# Patient Record
Sex: FEMALE | Race: WHITE | NOT HISPANIC OR LATINO | Employment: FULL TIME | URBAN - METROPOLITAN AREA
[De-identification: names, ages, dates, MRNs, and addresses within clinical notes are randomized per-mention and may not be internally consistent; named-entity substitution may affect disease eponyms.]

---

## 2017-04-11 ENCOUNTER — MYC MEDICAL ADVICE (OUTPATIENT)
Dept: ENDOCRINOLOGY | Facility: CLINIC | Age: 32
End: 2017-04-11

## 2017-04-12 ENCOUNTER — TELEPHONE (OUTPATIENT)
Dept: ENDOCRINOLOGY | Facility: CLINIC | Age: 32
End: 2017-04-12

## 2017-04-12 DIAGNOSIS — E03.8 OTHER SPECIFIED HYPOTHYROIDISM: ICD-10-CM

## 2017-04-12 DIAGNOSIS — E03.8 OTHER SPECIFIED HYPOTHYROIDISM: Primary | ICD-10-CM

## 2017-04-12 PROCEDURE — 84439 ASSAY OF FREE THYROXINE: CPT | Performed by: FAMILY MEDICINE

## 2017-04-12 PROCEDURE — 36415 COLL VENOUS BLD VENIPUNCTURE: CPT | Performed by: FAMILY MEDICINE

## 2017-04-12 PROCEDURE — 84443 ASSAY THYROID STIM HORMONE: CPT | Performed by: FAMILY MEDICINE

## 2017-04-13 ENCOUNTER — TELEPHONE (OUTPATIENT)
Dept: ENDOCRINOLOGY | Facility: CLINIC | Age: 32
End: 2017-04-13

## 2017-04-13 DIAGNOSIS — Z86.39 HISTORY OF CUSHING DISEASE: Primary | ICD-10-CM

## 2017-04-13 DIAGNOSIS — Z86.39 HISTORY OF CUSHING DISEASE: ICD-10-CM

## 2017-04-13 LAB
T4 FREE SERPL-MCNC: 1.46 NG/DL (ref 0.76–1.46)
TSH SERPL DL<=0.05 MIU/L-ACNC: <0.01 MU/L (ref 0.4–4)

## 2017-04-13 PROCEDURE — 82533 TOTAL CORTISOL: CPT | Performed by: FAMILY MEDICINE

## 2017-04-13 PROCEDURE — 36415 COLL VENOUS BLD VENIPUNCTURE: CPT | Performed by: FAMILY MEDICINE

## 2017-04-14 LAB — CORTIS SERPL-MCNC: 10.2 UG/DL (ref 4–22)

## 2017-05-02 DIAGNOSIS — E03.8 OTHER SPECIFIED HYPOTHYROIDISM: ICD-10-CM

## 2017-05-02 NOTE — TELEPHONE ENCOUNTER
levothyroxine  Last Written Prescription Date:  6/7/16  Last Fill Quantity: 90,   # refills: 3  Last Office Visit : 7/20/16  Future Office visit:  No future appt    Routing refill request to provider for review/approval because:  Drug not on the [facility], [facility] or [facility] refill protocol

## 2017-05-03 RX ORDER — LEVOTHYROXINE SODIUM 175 UG/1
175 TABLET ORAL DAILY
Qty: 90 TABLET | Refills: 3 | Status: SHIPPED | OUTPATIENT
Start: 2017-05-03 | End: 2018-02-12

## 2017-06-19 DIAGNOSIS — Z86.39 HISTORY OF CUSHING DISEASE: Primary | ICD-10-CM

## 2017-06-24 ENCOUNTER — HEALTH MAINTENANCE LETTER (OUTPATIENT)
Age: 32
End: 2017-06-24

## 2017-11-03 DIAGNOSIS — Z86.39 HISTORY OF CUSHING DISEASE: ICD-10-CM

## 2017-11-03 DIAGNOSIS — E03.8 OTHER SPECIFIED HYPOTHYROIDISM: ICD-10-CM

## 2017-11-03 DIAGNOSIS — E03.8 OTHER SPECIFIED HYPOTHYROIDISM: Primary | ICD-10-CM

## 2017-11-03 PROCEDURE — 82533 TOTAL CORTISOL: CPT | Performed by: FAMILY MEDICINE

## 2017-11-03 PROCEDURE — 84443 ASSAY THYROID STIM HORMONE: CPT | Performed by: FAMILY MEDICINE

## 2017-11-03 PROCEDURE — 36415 COLL VENOUS BLD VENIPUNCTURE: CPT | Performed by: FAMILY MEDICINE

## 2017-11-03 PROCEDURE — 84436 ASSAY OF TOTAL THYROXINE: CPT | Performed by: FAMILY MEDICINE

## 2017-11-04 LAB
CORTIS SERPL-MCNC: 10.1 UG/DL (ref 4–22)
T4 SERPL-MCNC: 16.2 UG/DL (ref 4.5–13.9)
TSH SERPL DL<=0.005 MIU/L-ACNC: <0.01 MU/L (ref 0.4–4)

## 2017-11-29 ENCOUNTER — TRANSFERRED RECORDS (OUTPATIENT)
Dept: HEALTH INFORMATION MANAGEMENT | Facility: CLINIC | Age: 32
End: 2017-11-29

## 2017-11-29 LAB
ABO + RH BLD: NORMAL
ABO + RH BLD: NORMAL
BLD GP AB SCN SERPL QL: NEGATIVE
C TRACH DNA SPEC QL PROBE+SIG AMP: NEGATIVE
HBV SURFACE AG SERPL QL IA: NEGATIVE
HIV 1+2 AB+HIV1 P24 AG SERPL QL IA: NEGATIVE
N GONORRHOEA DNA SPEC QL PROBE+SIG AMP: NEGATIVE
RUBELLA ANTIBODY IGG QUANTITATIVE: NORMAL IU/ML
SPECIMEN DESCRIP: NORMAL
SPECIMEN DESCRIPTION: NORMAL
T PALLIDUM IGG SER QL: NON REACTIVE

## 2017-12-04 DIAGNOSIS — Z86.39 HISTORY OF CUSHING DISEASE: ICD-10-CM

## 2017-12-04 DIAGNOSIS — E03.8 OTHER SPECIFIED HYPOTHYROIDISM: ICD-10-CM

## 2017-12-04 LAB
CORTIS SERPL-MCNC: 7 UG/DL (ref 4–22)
T4 SERPL-MCNC: 14.2 UG/DL (ref 4.5–13.9)

## 2017-12-04 PROCEDURE — 82533 TOTAL CORTISOL: CPT | Performed by: FAMILY MEDICINE

## 2017-12-04 PROCEDURE — 36415 COLL VENOUS BLD VENIPUNCTURE: CPT | Performed by: FAMILY MEDICINE

## 2017-12-04 PROCEDURE — 84436 ASSAY OF TOTAL THYROXINE: CPT | Performed by: FAMILY MEDICINE

## 2017-12-04 PROCEDURE — 84443 ASSAY THYROID STIM HORMONE: CPT | Performed by: FAMILY MEDICINE

## 2017-12-05 LAB — TSH SERPL DL<=0.005 MIU/L-ACNC: <0.01 MU/L (ref 0.4–4)

## 2018-01-23 DIAGNOSIS — E03.8 OTHER SPECIFIED HYPOTHYROIDISM: ICD-10-CM

## 2018-01-23 DIAGNOSIS — Z86.39 HISTORY OF CUSHING DISEASE: ICD-10-CM

## 2018-01-23 LAB
CORTIS SERPL-MCNC: 13.2 UG/DL (ref 4–22)
T4 SERPL-MCNC: 14.8 UG/DL (ref 4.5–13.9)

## 2018-01-23 PROCEDURE — 84436 ASSAY OF TOTAL THYROXINE: CPT | Performed by: FAMILY MEDICINE

## 2018-01-23 PROCEDURE — 36415 COLL VENOUS BLD VENIPUNCTURE: CPT | Performed by: FAMILY MEDICINE

## 2018-01-23 PROCEDURE — 82533 TOTAL CORTISOL: CPT | Performed by: FAMILY MEDICINE

## 2018-01-23 PROCEDURE — 84443 ASSAY THYROID STIM HORMONE: CPT | Performed by: FAMILY MEDICINE

## 2018-01-24 LAB — TSH SERPL DL<=0.005 MIU/L-ACNC: <0.01 MU/L (ref 0.4–4)

## 2018-01-31 ENCOUNTER — MYC MEDICAL ADVICE (OUTPATIENT)
Dept: ENDOCRINOLOGY | Facility: CLINIC | Age: 33
End: 2018-01-31

## 2018-02-12 DIAGNOSIS — E03.8 OTHER SPECIFIED HYPOTHYROIDISM: ICD-10-CM

## 2018-02-12 RX ORDER — LEVOTHYROXINE SODIUM 175 UG/1
TABLET ORAL
Qty: 96 TABLET | Refills: 3 | Status: ON HOLD | OUTPATIENT
Start: 2018-02-12 | End: 2018-05-25

## 2018-05-08 DIAGNOSIS — Z86.39 HISTORY OF CUSHING DISEASE: ICD-10-CM

## 2018-05-08 DIAGNOSIS — E03.8 OTHER SPECIFIED HYPOTHYROIDISM: ICD-10-CM

## 2018-05-08 LAB
CORTIS SERPL-MCNC: 19 UG/DL (ref 4–22)
T4 SERPL-MCNC: 15.7 UG/DL (ref 4.5–13.9)

## 2018-05-08 PROCEDURE — 84436 ASSAY OF TOTAL THYROXINE: CPT | Performed by: FAMILY MEDICINE

## 2018-05-08 PROCEDURE — 82533 TOTAL CORTISOL: CPT | Performed by: FAMILY MEDICINE

## 2018-05-08 PROCEDURE — 84443 ASSAY THYROID STIM HORMONE: CPT | Performed by: FAMILY MEDICINE

## 2018-05-08 PROCEDURE — 36415 COLL VENOUS BLD VENIPUNCTURE: CPT | Performed by: FAMILY MEDICINE

## 2018-05-09 LAB — TSH SERPL DL<=0.005 MIU/L-ACNC: <0.01 MU/L (ref 0.4–4)

## 2018-05-25 ENCOUNTER — HOSPITAL ENCOUNTER (INPATIENT)
Facility: CLINIC | Age: 33
LOS: 2 days | Discharge: HOME HEALTH | End: 2018-05-27
Attending: OBSTETRICS & GYNECOLOGY | Admitting: OBSTETRICS & GYNECOLOGY
Payer: COMMERCIAL

## 2018-05-25 ENCOUNTER — ANESTHESIA (OUTPATIENT)
Dept: OBGYN | Facility: CLINIC | Age: 33
End: 2018-05-25
Payer: COMMERCIAL

## 2018-05-25 ENCOUNTER — HOSPITAL ENCOUNTER (OUTPATIENT)
Dept: ULTRASOUND IMAGING | Facility: CLINIC | Age: 33
Discharge: HOME | End: 2018-05-25
Attending: OBSTETRICS & GYNECOLOGY | Admitting: OBSTETRICS & GYNECOLOGY
Payer: COMMERCIAL

## 2018-05-25 ENCOUNTER — ANESTHESIA EVENT (OUTPATIENT)
Dept: OBGYN | Facility: CLINIC | Age: 33
End: 2018-05-25
Payer: COMMERCIAL

## 2018-05-25 DIAGNOSIS — R52 PAIN: ICD-10-CM

## 2018-05-25 LAB
ABO + RH BLD: NORMAL
ABO + RH BLD: NORMAL
SPECIMEN EXP DATE BLD: NORMAL

## 2018-05-25 PROCEDURE — G0463 HOSPITAL OUTPT CLINIC VISIT: HCPCS | Mod: 25

## 2018-05-25 PROCEDURE — 12000037 ZZH R&B POSTPARTUM INTERMEDIATE

## 2018-05-25 PROCEDURE — 40000809 ZZH STATISTIC NO DOCUMENTATION TO SUPPORT CHARGE

## 2018-05-25 PROCEDURE — 25000128 H RX IP 250 OP 636: Performed by: OBSTETRICS & GYNECOLOGY

## 2018-05-25 PROCEDURE — 59025 FETAL NON-STRESS TEST: CPT

## 2018-05-25 PROCEDURE — 3E0R3BZ INTRODUCTION OF ANESTHETIC AGENT INTO SPINAL CANAL, PERCUTANEOUS APPROACH: ICD-10-PCS | Performed by: ANESTHESIOLOGY

## 2018-05-25 PROCEDURE — 25000128 H RX IP 250 OP 636: Performed by: ANESTHESIOLOGY

## 2018-05-25 PROCEDURE — 86900 BLOOD TYPING SEROLOGIC ABO: CPT | Performed by: OBSTETRICS & GYNECOLOGY

## 2018-05-25 PROCEDURE — 27110038 ZZH RX 271: Performed by: ANESTHESIOLOGY

## 2018-05-25 PROCEDURE — 25000125 ZZHC RX 250: Performed by: ANESTHESIOLOGY

## 2018-05-25 PROCEDURE — 37000011 ZZH ANESTHESIA WARD SERVICE

## 2018-05-25 PROCEDURE — 88307 TISSUE EXAM BY PATHOLOGIST: CPT | Mod: 26 | Performed by: OBSTETRICS & GYNECOLOGY

## 2018-05-25 PROCEDURE — 93971 EXTREMITY STUDY: CPT | Mod: LT

## 2018-05-25 PROCEDURE — 72200001 ZZH LABOR CARE VAGINAL DELIVERY SINGLE

## 2018-05-25 PROCEDURE — 25000132 ZZH RX MED GY IP 250 OP 250 PS 637: Performed by: OBSTETRICS & GYNECOLOGY

## 2018-05-25 PROCEDURE — 86780 TREPONEMA PALLIDUM: CPT | Performed by: OBSTETRICS & GYNECOLOGY

## 2018-05-25 PROCEDURE — 10907ZC DRAINAGE OF AMNIOTIC FLUID, THERAPEUTIC FROM PRODUCTS OF CONCEPTION, VIA NATURAL OR ARTIFICIAL OPENING: ICD-10-PCS | Performed by: OBSTETRICS & GYNECOLOGY

## 2018-05-25 PROCEDURE — 86901 BLOOD TYPING SEROLOGIC RH(D): CPT | Performed by: OBSTETRICS & GYNECOLOGY

## 2018-05-25 PROCEDURE — 36415 COLL VENOUS BLD VENIPUNCTURE: CPT | Performed by: OBSTETRICS & GYNECOLOGY

## 2018-05-25 PROCEDURE — 0HQ9XZZ REPAIR PERINEUM SKIN, EXTERNAL APPROACH: ICD-10-PCS | Performed by: OBSTETRICS & GYNECOLOGY

## 2018-05-25 PROCEDURE — 88307 TISSUE EXAM BY PATHOLOGIST: CPT | Performed by: OBSTETRICS & GYNECOLOGY

## 2018-05-25 PROCEDURE — 00HU33Z INSERTION OF INFUSION DEVICE INTO SPINAL CANAL, PERCUTANEOUS APPROACH: ICD-10-PCS | Performed by: ANESTHESIOLOGY

## 2018-05-25 RX ORDER — ROPIVACAINE HYDROCHLORIDE 2 MG/ML
10 INJECTION, SOLUTION EPIDURAL; INFILTRATION; PERINEURAL ONCE
Status: COMPLETED | OUTPATIENT
Start: 2018-05-25 | End: 2018-05-25

## 2018-05-25 RX ORDER — NALOXONE HYDROCHLORIDE 0.4 MG/ML
.1-.4 INJECTION, SOLUTION INTRAMUSCULAR; INTRAVENOUS; SUBCUTANEOUS
Status: DISCONTINUED | OUTPATIENT
Start: 2018-05-25 | End: 2018-05-25

## 2018-05-25 RX ORDER — EPHEDRINE SULFATE 50 MG/ML
5 INJECTION, SOLUTION INTRAMUSCULAR; INTRAVENOUS; SUBCUTANEOUS
Status: DISCONTINUED | OUTPATIENT
Start: 2018-05-25 | End: 2018-05-25

## 2018-05-25 RX ORDER — OXYCODONE AND ACETAMINOPHEN 5; 325 MG/1; MG/1
1 TABLET ORAL
Status: DISCONTINUED | OUTPATIENT
Start: 2018-05-25 | End: 2018-05-25

## 2018-05-25 RX ORDER — FENTANYL CITRATE 50 UG/ML
100 INJECTION, SOLUTION INTRAMUSCULAR; INTRAVENOUS ONCE
Status: COMPLETED | OUTPATIENT
Start: 2018-05-25 | End: 2018-05-25

## 2018-05-25 RX ORDER — OXYTOCIN/0.9 % SODIUM CHLORIDE 30/500 ML
100 PLASTIC BAG, INJECTION (ML) INTRAVENOUS CONTINUOUS
Status: DISCONTINUED | OUTPATIENT
Start: 2018-05-25 | End: 2018-05-27 | Stop reason: HOSPADM

## 2018-05-25 RX ORDER — OXYTOCIN/0.9 % SODIUM CHLORIDE 30/500 ML
340 PLASTIC BAG, INJECTION (ML) INTRAVENOUS CONTINUOUS PRN
Status: DISCONTINUED | OUTPATIENT
Start: 2018-05-25 | End: 2018-05-27 | Stop reason: HOSPADM

## 2018-05-25 RX ORDER — HYDROCORTISONE 2.5 %
CREAM (GRAM) TOPICAL 3 TIMES DAILY PRN
Status: DISCONTINUED | OUTPATIENT
Start: 2018-05-25 | End: 2018-05-27 | Stop reason: HOSPADM

## 2018-05-25 RX ORDER — METHYLERGONOVINE MALEATE 0.2 MG/ML
200 INJECTION INTRAVENOUS
Status: DISCONTINUED | OUTPATIENT
Start: 2018-05-25 | End: 2018-05-25

## 2018-05-25 RX ORDER — LIDOCAINE HYDROCHLORIDE AND EPINEPHRINE 15; 5 MG/ML; UG/ML
3 INJECTION, SOLUTION EPIDURAL
Status: DISCONTINUED | OUTPATIENT
Start: 2018-05-25 | End: 2018-05-25

## 2018-05-25 RX ORDER — NALOXONE HYDROCHLORIDE 0.4 MG/ML
.1-.4 INJECTION, SOLUTION INTRAMUSCULAR; INTRAVENOUS; SUBCUTANEOUS
Status: DISCONTINUED | OUTPATIENT
Start: 2018-05-25 | End: 2018-05-27 | Stop reason: HOSPADM

## 2018-05-25 RX ORDER — FENTANYL CITRATE 50 UG/ML
50-100 INJECTION, SOLUTION INTRAMUSCULAR; INTRAVENOUS
Status: DISCONTINUED | OUTPATIENT
Start: 2018-05-25 | End: 2018-05-25

## 2018-05-25 RX ORDER — PRENATAL VIT/IRON FUM/FOLIC AC 27MG-0.8MG
1 TABLET ORAL DAILY
COMMUNITY
End: 2018-09-21

## 2018-05-25 RX ORDER — ACETAMINOPHEN 325 MG/1
650 TABLET ORAL EVERY 4 HOURS PRN
Status: DISCONTINUED | OUTPATIENT
Start: 2018-05-25 | End: 2018-05-27 | Stop reason: HOSPADM

## 2018-05-25 RX ORDER — AMOXICILLIN 250 MG
2 CAPSULE ORAL 2 TIMES DAILY
Status: DISCONTINUED | OUTPATIENT
Start: 2018-05-25 | End: 2018-05-27 | Stop reason: HOSPADM

## 2018-05-25 RX ORDER — NALBUPHINE HYDROCHLORIDE 10 MG/ML
2.5-5 INJECTION, SOLUTION INTRAMUSCULAR; INTRAVENOUS; SUBCUTANEOUS EVERY 6 HOURS PRN
Status: DISCONTINUED | OUTPATIENT
Start: 2018-05-25 | End: 2018-05-25

## 2018-05-25 RX ORDER — ACETAMINOPHEN 325 MG/1
650 TABLET ORAL EVERY 4 HOURS PRN
Status: DISCONTINUED | OUTPATIENT
Start: 2018-05-25 | End: 2018-05-25

## 2018-05-25 RX ORDER — ONDANSETRON 2 MG/ML
4 INJECTION INTRAMUSCULAR; INTRAVENOUS EVERY 6 HOURS PRN
Status: DISCONTINUED | OUTPATIENT
Start: 2018-05-25 | End: 2018-05-25

## 2018-05-25 RX ORDER — AMOXICILLIN 250 MG
1 CAPSULE ORAL 2 TIMES DAILY
Status: DISCONTINUED | OUTPATIENT
Start: 2018-05-25 | End: 2018-05-27 | Stop reason: HOSPADM

## 2018-05-25 RX ORDER — LEVOTHYROXINE SODIUM 175 UG/1
175 TABLET ORAL
Status: DISCONTINUED | OUTPATIENT
Start: 2018-05-26 | End: 2018-05-25

## 2018-05-25 RX ORDER — IBUPROFEN 400 MG/1
800 TABLET, FILM COATED ORAL
Status: DISCONTINUED | OUTPATIENT
Start: 2018-05-25 | End: 2018-05-25

## 2018-05-25 RX ORDER — CARBOPROST TROMETHAMINE 250 UG/ML
250 INJECTION, SOLUTION INTRAMUSCULAR
Status: DISCONTINUED | OUTPATIENT
Start: 2018-05-25 | End: 2018-05-25

## 2018-05-25 RX ORDER — OXYTOCIN/0.9 % SODIUM CHLORIDE 30/500 ML
100-340 PLASTIC BAG, INJECTION (ML) INTRAVENOUS CONTINUOUS PRN
Status: COMPLETED | OUTPATIENT
Start: 2018-05-25 | End: 2018-05-25

## 2018-05-25 RX ORDER — MISOPROSTOL 200 UG/1
400 TABLET ORAL
Status: DISCONTINUED | OUTPATIENT
Start: 2018-05-25 | End: 2018-05-27 | Stop reason: HOSPADM

## 2018-05-25 RX ORDER — LANOLIN 100 %
OINTMENT (GRAM) TOPICAL
Status: DISCONTINUED | OUTPATIENT
Start: 2018-05-25 | End: 2018-05-27 | Stop reason: HOSPADM

## 2018-05-25 RX ORDER — SODIUM CHLORIDE, SODIUM LACTATE, POTASSIUM CHLORIDE, CALCIUM CHLORIDE 600; 310; 30; 20 MG/100ML; MG/100ML; MG/100ML; MG/100ML
INJECTION, SOLUTION INTRAVENOUS CONTINUOUS
Status: DISCONTINUED | OUTPATIENT
Start: 2018-05-25 | End: 2018-05-25

## 2018-05-25 RX ORDER — BISACODYL 10 MG
10 SUPPOSITORY, RECTAL RECTAL DAILY PRN
Status: DISCONTINUED | OUTPATIENT
Start: 2018-05-27 | End: 2018-05-27 | Stop reason: HOSPADM

## 2018-05-25 RX ORDER — IBUPROFEN 400 MG/1
800 TABLET, FILM COATED ORAL EVERY 6 HOURS PRN
Status: DISCONTINUED | OUTPATIENT
Start: 2018-05-25 | End: 2018-05-27 | Stop reason: HOSPADM

## 2018-05-25 RX ORDER — LEVOTHYROXINE SODIUM 175 UG/1
175 TABLET ORAL DAILY
Status: DISCONTINUED | OUTPATIENT
Start: 2018-05-25 | End: 2018-05-25

## 2018-05-25 RX ORDER — OXYTOCIN 10 [USP'U]/ML
10 INJECTION, SOLUTION INTRAMUSCULAR; INTRAVENOUS
Status: DISCONTINUED | OUTPATIENT
Start: 2018-05-25 | End: 2018-05-27 | Stop reason: HOSPADM

## 2018-05-25 RX ORDER — OXYTOCIN 10 [USP'U]/ML
10 INJECTION, SOLUTION INTRAMUSCULAR; INTRAVENOUS
Status: DISCONTINUED | OUTPATIENT
Start: 2018-05-25 | End: 2018-05-25

## 2018-05-25 RX ORDER — NORTRIPTYLINE HYDROCHLORIDE 50 MG/1
50 CAPSULE ORAL AT BEDTIME
Status: DISCONTINUED | OUTPATIENT
Start: 2018-05-25 | End: 2018-05-27 | Stop reason: HOSPADM

## 2018-05-25 RX ORDER — LIDOCAINE HYDROCHLORIDE AND EPINEPHRINE 15; 5 MG/ML; UG/ML
INJECTION, SOLUTION EPIDURAL PRN
Status: DISCONTINUED | OUTPATIENT
Start: 2018-05-25 | End: 2018-05-27 | Stop reason: HOSPADM

## 2018-05-25 RX ORDER — PENICILLIN G POTASSIUM 5000000 [IU]/1
5 INJECTION, POWDER, FOR SOLUTION INTRAMUSCULAR; INTRAVENOUS ONCE
Status: COMPLETED | OUTPATIENT
Start: 2018-05-25 | End: 2018-05-25

## 2018-05-25 RX ADMIN — PENICILLIN G POTASSIUM 5 MILLION UNITS: 5000000 POWDER, FOR SOLUTION INTRAMUSCULAR; INTRAPLEURAL; INTRATHECAL; INTRAVENOUS at 09:35

## 2018-05-25 RX ADMIN — SODIUM CHLORIDE, POTASSIUM CHLORIDE, SODIUM LACTATE AND CALCIUM CHLORIDE: 600; 310; 30; 20 INJECTION, SOLUTION INTRAVENOUS at 09:22

## 2018-05-25 RX ADMIN — FENTANYL CITRATE 100 MCG: 50 INJECTION, SOLUTION INTRAMUSCULAR; INTRAVENOUS at 10:19

## 2018-05-25 RX ADMIN — NORTRIPTYLINE HYDROCHLORIDE 50 MG: 50 CAPSULE ORAL at 23:34

## 2018-05-25 RX ADMIN — ROPIVACAINE HYDROCHLORIDE 8 ML: 2 INJECTION, SOLUTION EPIDURAL; INFILTRATION at 12:55

## 2018-05-25 RX ADMIN — LIDOCAINE HYDROCHLORIDE AND EPINEPHRINE 3 ML: 15; 5 INJECTION, SOLUTION EPIDURAL at 12:51

## 2018-05-25 RX ADMIN — FENTANYL CITRATE 100 MCG: 50 INJECTION, SOLUTION INTRAMUSCULAR; INTRAVENOUS at 12:55

## 2018-05-25 RX ADMIN — SODIUM CHLORIDE 2.5 MILLION UNITS: 9 INJECTION, SOLUTION INTRAVENOUS at 17:24

## 2018-05-25 RX ADMIN — Medication 12 ML/HR: at 13:04

## 2018-05-25 RX ADMIN — FENTANYL CITRATE 100 MCG: 50 INJECTION, SOLUTION INTRAMUSCULAR; INTRAVENOUS at 11:18

## 2018-05-25 RX ADMIN — OXYTOCIN 340 ML/HR: 10 INJECTION, SOLUTION INTRAMUSCULAR; INTRAVENOUS at 18:15

## 2018-05-25 RX ADMIN — SENNOSIDES AND DOCUSATE SODIUM 1 TABLET: 8.6; 5 TABLET ORAL at 23:34

## 2018-05-25 RX ADMIN — SODIUM CHLORIDE 2.5 MILLION UNITS: 9 INJECTION, SOLUTION INTRAVENOUS at 13:34

## 2018-05-25 RX ADMIN — IBUPROFEN 800 MG: 400 TABLET ORAL at 20:16

## 2018-05-25 RX ADMIN — SODIUM CHLORIDE, POTASSIUM CHLORIDE, SODIUM LACTATE AND CALCIUM CHLORIDE: 600; 310; 30; 20 INJECTION, SOLUTION INTRAVENOUS at 13:34

## 2018-05-25 RX ADMIN — FENTANYL CITRATE 100 MCG: 50 INJECTION, SOLUTION INTRAMUSCULAR; INTRAVENOUS at 09:22

## 2018-05-25 RX ADMIN — SODIUM CHLORIDE, POTASSIUM CHLORIDE, SODIUM LACTATE AND CALCIUM CHLORIDE 1000 ML: 600; 310; 30; 20 INJECTION, SOLUTION INTRAVENOUS at 12:29

## 2018-05-25 RX ADMIN — ACETAMINOPHEN 650 MG: 325 TABLET ORAL at 20:16

## 2018-05-25 NOTE — L&D DELIVERY NOTE
OB Delivery Summary      HISTORY OF PRESENT ILLNESS:   with  IUP @ 36w3d who presented to L&D with painful contractions and spotting. Cervix was closed in the office yesterday and GBS was collected.  Her prenatal course was  complicated by: IVF pregnancy and hypothyroidism.  Prenatal labs were significant for blood type A+, rubella status immune.  Glucose challenge test 99.  Group beta strep culture was pending/uknown.  Estimated fetal weight on admission was approximately 5.5lb.         FIRST STAGE OF LABOR:  The patient was admitted to Labor and Delivery with a fetal heart rate tracing that was category I. Her cervix changed from 0.5 to 1.5cm after admission. Penicillin was started for GBS unknown status and NICU consult was obtained. She gradually progressed in labor without augmentation and received an epidural for pain control at her request. Amniotomy was performed at 3:20pm for clear fluid. She became completely dilated at 5:28pm and did not feel an urge to push.       SECOND STAGE OF LABOR:  The patient began pushing at 5:50pm from the +3 station.  She gradually brought the vertex down in the birth canal and delivered a viable female infant at 6:12pm. There was a 5 minute bradycardia down to the 60's immediately prior to delivery.  After delivery of the head, the anterior shoulder and body delivered without difficulty.  No nuchal cord was noted. The infant was placed on the mother's chest.  Delayed cord clamping was performed.       THIRD STAGE OF LABOR:  The cord was clamped and cut and the infant was brought to the warmer for evaluation by the NICU NNP. The placenta then delivered spontaneously and appeared intact with a 3-vessel cord at 6:45pm.  Pitocin was started and fundal massage was performed.  Perineum was inspected and a 1st degree laceration was noted. It was repaired with a running suture of 3-0 Vicryl. A small left labial laceration was also reapproximated with interrupted sutures of 4-0  Vicryl. Quantitative blood loss for the delivery was 300cc.       Both mother and baby tolerated delivery well and there were no complications. Fetal weight was 5lb8oz and Apgars were 7 and 9 at 1 and 5 minutes, respectively.       Carmen Medel  5/25/2018  6:43 PM

## 2018-05-25 NOTE — IP AVS SNAPSHOT
[address]    [place] [address]    Phone:  [phone]                                       After Visit Summary   5/25/2018    Dean Weathers    MRN: [id number]           After Visit Summary Signature Page     I have received my discharge instructions, and my questions have been answered. I have discussed any challenges I see with this plan with the nurse or doctor.    ..........................................................................................................................................  Patient/Patient Representative Signature      ..........................................................................................................................................  Patient Representative Print Name and Relationship to Patient    ..................................................               ................................................  Date                                            Time    ..........................................................................................................................................  Reviewed by Signature/Title    ...................................................              ..............................................  Date                                                            Time

## 2018-05-25 NOTE — CONSULTS
Neonatology Antepartum Counseling Consult      I was asked to provide antepartum counseling for Dean Weathers at the request of Tyrell Hendrix MD secondary to  labor,. Ms. Weathers is currently 36 3/7 weeks and has a hx significant for PTL. Betamethasone was not administered . Ms. Weathers, accompanied by her , was counseled on the expected hospital course, potential risks, and outcomes associated with an infant born at approximately 36 3/7 weeks gestation. The counseling included: morbidity, mortality, initial delivery room stabilization, respiratory course, lung development,  hyperbilirubinemia,  Infection,nutrition, growth and development, and long term outcomes. Please feel free to call with any additional questions or concerns.          Total Time (minutes): 30  IAIN Mera, CNP 2018 11:10 AM

## 2018-05-25 NOTE — PROGRESS NOTES
"OB Progress Note  2018  3:27 PM    S:  Pt with excellent pain control with the epidural.   No other complaints.      O:  /67  Temp 99.2  F (37.3  C) (Temporal)  Resp 16  Ht 1.626 m (5' 4\")  Wt 63.5 kg (140 lb)  SpO2 99%  BMI 24.03 kg/m2  EFM: baseline 120, accelerations are present or absent, no decelerations, moderate variability; Category I  [place]:  Ctx q 2-6 min  SVE:  6/95%/0  Membranes: AROM by myself at 0320 hours, clear fluid      A/P:  33 year old  @36w3d admitted for spontaneous painful contractions and cervical change.  GBS status unknown, treated with PCN per protocol.  Now in active labor, 6 cm dilated and vertex at  0 station.  Satisfactory fetal status.     1.  Continuous fetal monitoring  2.  Pitocin augmentation if necessary, but for now the patient is making progress with spontaneous labor and AROM augmentation.   3.  Anticipate   4.  Plan of care discussed with the patient and her , who express understanding and agreement.     Tyrell Hendrix    "

## 2018-05-25 NOTE — PLAN OF CARE
0708-  36+3 week pt of [facility] to [facility] from home stating she has had left leg pain for the past 24 hours and contractions that are 5-6 minutes apart.  States she has had some pink/red DC since Thursday at her last visit.  Pt states her cervical exam at that time was closed, head was low.    0720- US calls and is ready for pt. To come down for doppler US.  POC reviewed with pt.SVE Ft-1cm. 75% /0 station.  No bleeding noted with exam.   0724- pt to US  0740- pt returns.  Monitors explained and applied with pt permission.  Support offered. POC reviewed again.    0800- dr. Hendrix calls for update.  Informed pt arrived, SVE prior to US. US negative for DVT, UC pattern.  Orders to recheck cervix at 0830 and update, PO hydrate per Dr. Hendrix.   0845-cervix recheck 1+, 90%, 0.  Pt states UC more uncomfortable.  Call to dr. Hendrix.  0852- Dr. Hendrix updated on pt cervical change, contraction pattern q 6 minutes, palp moderate to strong, pain is increasing, +NST. Orders to admit, start PCN for unknown GBS, observe for labor, Fentanyl IV at this time for pain management, may have epidural or ITN as she progresses and needs.  MD will round on pt this morning.-  0907- report to Loida Garcia.  Care taken over.

## 2018-05-25 NOTE — PROGRESS NOTES
Dr. Prado at bedside for epidural placement. Fentanyl and Ropivicane handed off to Dr. Prado. Consent signed. Time out taken prior to procedure.

## 2018-05-25 NOTE — H&P
Dean Weathers  [id number]  OB Admit History & Physical    LATE ENTRY   PATIENT SEEN AT 1000 HOURS 18      HPI:  Ms. Weathers  is a 33 year old  @ 36w3d by ultrasound who presented to L&D for evaluation of regular strong uterine contractions.  The patient was seen earlier this morning for a venous doppler of the left leg. She had called last evening reporting the left calf tenderness without swelling or erythema or temperature change.  The venous doppler was normal.  The patient had called at 0600 hours reporting the contractions.       Prenatal course:  1st visit at 11 3/7 weeks, regular care, TWG 20#.    Pregnancy complications:  IVF pregnancy, hypothyroidism (managed by Endocrine)    Prenatal labs:  A positive, antibody screen negative,  Rubella  Immune, Hep B/HIV/RPR all negative, GC/CT negative, GCT 99,  GBS done, but results unknown. She will be treated in labor.  genetic screening tests First trimester screen normal, MSAFP in range    Pregnancy dating by IVF transfer and confirmed with a first trimester ultrasound.      20 week ultrasound was normal, no previa, normal anatomy, normal fluid         OB history:   Obstetric History       T0      L0     SAB0   TAB0   Ectopic0   Multiple0   Live Births0       # Outcome Date GA Lbr Roberto/2nd Weight Sex Delivery Anes PTL Lv   1 Current                     PMHx:     Past Medical History:   Diagnosis Date     Central hypothyroidism      Cushing disease (H)      Hx of previous reproductive problem     IVF pregnancy     Raynaud's syndrome      Unspecified endocrine disorder     Pituitary Tumor,Microadenoma       PSHX:    Past Surgical History:   Procedure Laterality Date     C NONSPECIFIC PROCEDURE  3/04,     Mass on pituitary gland. 1/2 of gland removed     PITUITARY SURGERY  3/04 and     for cushing disease     wisdom teeth         Meds:    No current outpatient prescriptions on file.       Allergies: Metronidazole      REVIEW OF  "SYSTEMS:  Positives and negatives in HPI.     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 Yes     Social History Narrative    ** Merged History Encounter **             Fam Hx:    Family History   Problem Relation Age of Onset     HEART DISEASE Father      MI age 37     Family History Negative Mother      Family History Negative Sister      HEART DISEASE Maternal Grandfather      MI     HEART DISEASE Paternal Grandfather      MI     Breast Cancer Maternal Grandmother       age 48     Coronary Artery Disease Father      MI age 37     Ulcerative Colitis Mother      Rheumatoid Arthritis Mother          PHYSICAL EXAM:      Vitals:  /71  Temp 99.7  F (37.6  C) (Temporal)  Resp 16  Ht 1.626 m (5' 4\")  Wt 63.5 kg (140 lb)  SpO2 100%  BMI 24.03 kg/m2  Alert Awake in NAD  ABD gravid, non-tender, EFW 6#  Cervix:  1-2 cm / 90 % effaced at 0 station, membranes are intact  EFM:  Baseline 145, with moderate variability, accels are present, no decels  [place]: contractions q 2-4 min    Assessment:  IUP at 36w3d admitted for evaluation of painful uterine contractions.  The cervix is changing, consistent with labor.  GBS status unknown, will treat in labor.  Satisfactory fetal status, category I tracing.     Plan:  Admission            Continuous fetal and uterine monitoring            Analgesia as needed.  The patient was given Fentanyl moments ago.  Epidural as necessary and desired            The plan of care was discussed with the " patient and her partner.  They expressed understanding and agreement.              team to come discuss the anticipated impending delivery of a 36+ week fetus.              Delivery expected later today, therefore it would be ineffective to initiate a course of steroids for fetal lung development.             Anticipate        Tyrell Hendrix MD  Dept of OB/GYN  May 25, 2018

## 2018-05-25 NOTE — IP AVS SNAPSHOT
MRN:[id number]                      After Visit Summary   5/25/2018    Dean Weathers    MRN: [id number]           Thank you!     Thank you for choosing [facility] for your care. Our goal is always to provide you with excellent care. Hearing back from our patients is one way we can continue to improve our services. Please take a few minutes to complete the written survey that you may receive in the mail after you visit with us. Thank you!        Patient Information     Date Of Birth          1985        About your hospital stay     You were admitted on:  May 25, 2018 You last received care in the:  [address]    You were discharged on:  May 27, 2018        Reason for your hospital stay       Maternity care                  Who to Call     For medical emergencies, please call 911.  For non-urgent questions about your medical care, please call your primary care provider or clinic, [phone]          Attending Provider     Provider Specialty    Tyrell Hendrix MD OB/Gyn    Carmen Medel MD OB/Gyn       Primary Care Provider Office Phone # Fax #    TRICIA Recio [facility] [phone]      After Care Instructions     Activity       Review discharge instructions            Diet       Resume previous diet            Discharge Instructions - Postpartum visit       Schedule postpartum visit with your provider and return to clinic in 1 week for a perineum check and in 6 weeks for postpartum visit                  Further instructions from your care team       Postpartum Vaginal Delivery Instructions    Activity       Ask family and friends for help when you need it.    Do not place anything in your vagina for 6 weeks.    You are not restricted on other activities, but take it easy for a few weeks to allow your body to recover from delivery.  You are able to do any activities you feel up to that point.    No driving until you have stopped  taking your pain medications (usually two weeks after delivery).     Call your health care provider if you have any of these symptoms:       Increased pain, swelling, redness, or fluid around your stiches from an episiotomy or perineal tear.    A fever above 100.4 F (38 C) with or without chills when placing a thermometer under your tongue.    You soak a sanitary pad with blood within 1 hour, or you see blood clots larger than a golf ball.    Bleeding that lasts more than 6 weeks.    Vaginal discharge that smells bad.    Severe pain, cramping or tenderness in your lower belly area.    A need to urinate more frequently (use the toilet more often), more urgently (use the toilet very quickly), or it burns when you urinate.    Nausea and vomiting.    Redness, swelling or pain around a vein in your leg.    Problems breastfeeding or a red or painful area on your breast.    Chest pain and cough or are gasping for air.    Problems coping with sadness, anxiety, or depression.  If you have any concerns about hurting yourself or the baby, call your provider immediately.     You have questions or concerns after you return home.     Keep your hands clean:  Always wash your hands before touching your perineal area and stitches.  This helps reduce your risk of infection.  If your hands aren't dirty, you may use an alcohol hand-rub to clean your hands. Keep your nails clean and short.        Pending Results     Date and Time Order Name Status Description    5/25/2018 1853 Placenta path order and indications In process             Statement of Approval     Ordered          05/27/18 0934  I have reviewed and agree with all the recommendations and orders detailed in this document.  EFFECTIVE NOW     Approved and electronically signed by:  Carmen Medel MD             Admission Information     Date & Time Provider Department Dept. Phone    5/25/2018 Carmen Medel MD [address] [phone]  "     Your Vitals Were     Blood Pressure Pulse Temperature Respirations Height Weight    139/92 86 98.3  F (36.8  C) (Oral) 16 1.626 m (5' 4\") 63.5 kg (140 lb)    Pulse Oximetry BMI (Body Mass Index)                100% 24.03 kg/m2          MyChart Information     [employer] gives you secure access to your electronic health record. If you see a primary care provider, you can also send messages to your care team and make appointments. If you have questions, please call your primary care clinic.  If you do not have a primary care provider, please call [phone] and they will assist you.        Care EveryWhere ID     This is your Care EveryWhere ID. This could be used by other organizations to access your [facility] medical records  [id number]        Equal Access to Services     LINDA MASSEY : Gloria Chiu, lori ashby, eliz montero. So [facility] [phone].    ATENCIÓN: Si habla español, tiene a pepe disposición servicios gratuitos de asistencia lingüística. Llame al [phone].    We comply with applicable federal civil rights laws and [place] laws. We do not discriminate on the basis of race, color, national origin, age, disability, sex, sexual orientation, or gender identity.               Review of your medicines      START taking        Dose / Directions    acetaminophen 325 MG tablet   Commonly known as:  TYLENOL        Dose:  650 mg   Take 2 tablets (650 mg) by mouth every 4 hours as needed for mild pain or fever (greater than or equal to 38? C /100.4? F (oral) or 38.5? C/ 101.4? F (core).)   Quantity:  100 tablet   Refills:  0       ibuprofen 800 MG tablet   Commonly known as:  ADVIL/MOTRIN        Dose:  800 mg   Take 1 tablet (800 mg) by mouth every 6 hours as needed for other (cramping)   Quantity:  120 tablet   Refills:  0       oxyCODONE IR 10 MG tablet   Commonly known as:  ROXICODONE        Dose:  5 mg   Take 0.5 tablets (5 mg) " by mouth every 3 hours as needed for moderate to severe pain   Quantity:  10 tablet   Refills:  0       senna-docusate 8.6-50 MG per tablet   Commonly known as:  SENOKOT-S;PERICOLACE        Dose:  2 tablet   Take 2 tablets by mouth 2 times daily as needed for constipation   Quantity:  100 tablet   Refills:  0         CONTINUE these medicines which have NOT CHANGED        Dose / Directions    * LEVOTHYROXINE SODIUM PO        Dose:  175 mcg   Take 175 mcg by mouth Daily except Tuesdays   Refills:  0       * LEVOTHYROXINE SODIUM PO        Dose:  262.5 mcg   Take 262.5 mcg by mouth Tuesdays   Refills:  0       NORTRIPTYLINE HCL PO        Dose:  50 mg   Take 50 mg by mouth At Bedtime   Refills:  0       prenatal multivitamin plus iron 27-0.8 MG Tabs per tablet   Indication:  Pregnancy        Dose:  1 tablet   Take 1 tablet by mouth daily   Refills:  0       VITAMIN D (CHOLECALCIFEROL) PO        Dose:  1000 Units   Take 1,000 Units by mouth daily   Refills:  0       * Notice:  This list has 2 medication(s) that are the same as other medications prescribed for you. Read the directions carefully, and ask your doctor or other care provider to review them with you.      STOP taking     TORADOL IJ                Where to get your medicines      Some of these will need a paper prescription and others can be bought over the counter. Ask your nurse if you have questions.     Bring a paper prescription for each of these medications     oxyCODONE IR 10 MG tablet       You don't need a prescription for these medications     acetaminophen 325 MG tablet    ibuprofen 800 MG tablet    senna-docusate 8.6-50 MG per tablet                Protect others around you: Learn how to safely use, store and throw away your medicines at www.disposemymeds.org.        Information about OPIOIDS     PRESCRIPTION OPIOIDS: WHAT YOU NEED TO KNOW   You have a prescription for an opioid (narcotic) pain medicine. Opioids can cause addiction. If you have a  history of chemical dependency of any type, you are at a higher risk of becoming addicted to opioids. Only take this medicine after all other options have been tried. Take it for as short a time and as few doses as possible.     Do not:    Drive. If you drive while taking these medicines, you could be arrested for driving under the influence (DUI).    Operate heavy machinery    Do any other dangerous activities while taking these medicines.     Drink any alcohol while taking these medicines.      Take with any other medicines that contain acetaminophen. Read all labels carefully. Look for the word  acetaminophen  or  Tylenol.  Ask your pharmacist if you have questions or are unsure.    Store your pills in a secure place, locked if possible. We will not replace any lost or stolen medicine. If you don t finish your medicine, please throw away (dispose) as directed by your pharmacist. The [place] Pollution Control Agency has more information about safe disposal: https://www.pca.[facility].us/living-green/managing-unwanted-medications    All opioids tend to cause constipation. Drink plenty of water and eat foods that have a lot of fiber, such as fruits, vegetables, prune juice, apple juice and high-fiber cereal. Take a laxative (Miralax, milk of magnesia, Colace, Senna) if you don t move your bowels at least every other day.              Medication List: This is a list of all your medications and when to take them. Check marks below indicate your daily home schedule. Keep this list as a reference.      Medications           Morning Afternoon Evening Bedtime As Needed    acetaminophen 325 MG tablet   Commonly known as:  TYLENOL   Take 2 tablets (650 mg) by mouth every 4 hours as needed for mild pain or fever (greater than or equal to 38? C /100.4? F (oral) or 38.5? C/ 101.4? F (core).)   Last time this was given:  650 mg on 5/27/2018  7:54 AM                                ibuprofen 800 MG tablet   Commonly known as:   ADVIL/MOTRIN   Take 1 tablet (800 mg) by mouth every 6 hours as needed for other (cramping)   Last time this was given:  800 mg on 5/27/2018  7:54 AM                                * LEVOTHYROXINE SODIUM PO   Take 175 mcg by mouth Daily except Tuesdays   Last time this was given:  175 mcg on 5/27/2018  5:55 AM                                * LEVOTHYROXINE SODIUM PO   Take 262.5 mcg by mouth Tuesdays   Last time this was given:  175 mcg on 5/27/2018  5:55 AM                                NORTRIPTYLINE HCL PO   Take 50 mg by mouth At Bedtime   Last time this was given:  50 mg on 5/26/2018 10:59 PM                                oxyCODONE IR 10 MG tablet   Commonly known as:  ROXICODONE   Take 0.5 tablets (5 mg) by mouth every 3 hours as needed for moderate to severe pain   Last time this was given:  10 mg on 5/26/2018 11:52 PM                                prenatal multivitamin plus iron 27-0.8 MG Tabs per tablet   Take 1 tablet by mouth daily                                senna-docusate 8.6-50 MG per tablet   Commonly known as:  SENOKOT-S;PERICOLACE   Take 2 tablets by mouth 2 times daily as needed for constipation   Last time this was given:  2 tablets on 5/27/2018  7:54 AM                                VITAMIN D (CHOLECALCIFEROL) PO   Take 1,000 Units by mouth daily                                * Notice:  This list has 2 medication(s) that are the same as other medications prescribed for you. Read the directions carefully, and ask your doctor or other care provider to review them with you.

## 2018-05-25 NOTE — ANESTHESIA PROCEDURE NOTES
Peripheral nerve/Neuraxial procedure note : epidural catheter  Pre-Procedure  Performed by DORIAN LOPEZ  Location: OB      Pre-Anesthestic Checklist: patient identified, IV checked, risks and benefits discussed, informed consent and pre-op evaluation    Timeout  Correct Patient: Yes   Correct Procedure: Yes   Correct Site: Yes   Correct Laterality: N/A   Correct Position: Yes   Site Marked: N/A   .   Procedure Documentation    .    Procedure:    Epidural catheter.  Insertion Site:L3-4  (midline approach) Injection technique: LORT saline   Local skin infiltrated with mL of 1% lidocaine.  TREY at 4 cm     Patient Prep;mask, sterile gloves, povidone-iodine 7.5% surgical scrub, patient draped.  .  Needle: [employer] needle Needle Gauge: 17.    Needle Length (Inches) 3.5  # of attempts: 1 and # of redirects:  .   . .  Catheter threaded easily  4 cm epidural space.  8 cm at skin.   .    Assessment/Narrative  Paresthesias: No.  .  .  Aspiration negative for heme or CSF  . Test dose of 3 mL lidocaine 1.5% w/ 1:200,000 epinephrine at. Test dose negative for signs of intravascular, subdural or intrathecal injection. Comments:  Clarke TREY at 4 cm.  Catheter threaded easily.  Negative aspiration.  Negative test dose.  No abnormal pain or paresthesia throughout.  Patient tolerated well.

## 2018-05-25 NOTE — PROGRESS NOTES
Report from Nadia MCCARTHY Patient taken to room 214 via WC. Monitors on, PIV started. Patient requesting fentanyl. Fentanyl given, patient feeling a decrease in pain. First dose of PCN started per GBS protocol.

## 2018-05-25 NOTE — PLAN OF CARE
Problem: Labor (Cervical Ripen, Induct, Augment) (Adult,Obstetrics,Pediatric)  Goal: Signs and Symptoms of Listed Potential Problems Will be Absent, Minimized or Managed (Labor)  Signs and symptoms of listed potential problems will be absent, minimized or managed by discharge/transition of care (reference Labor (Cervical Ripen, Induct, Augment) (Adult,Obstetrics,Pediatric) C[facility]).   Outcome: Therapy, progress toward functional goals as expected  Cat II tracing. Periods of minimal and moderate variability with accels, no decels. VSS. Patient's labor progressing. Comfortable with epidural.

## 2018-05-25 NOTE — H&P
"OB Brief Admit H&P    No significant change in general health status based on examination of the patient, review of Nursing Admission Database and prenatal record.    Pt is a 33 year old  @ 36w3d who presented to L&D with painful regular uterine contractions and spotting. Cervix was closed in the office yesterday. Also complained of left leg pain since yesterday.     Patient's prenatal course has been complicated by:   1. IVF pregnancy  2. Hypothyroidism - on Synthroid  3.  labor    Prenatal Labs:    Blood type A+  Rubella immune  GCT 99  GBS pending/unknown    EFW: 5.5lb    /80  Temp 99.7  F (37.6  C) (Temporal)  Resp 16  Ht 1.626 m (5' 4\")  Wt 63.5 kg (140 lb)  BMI 24.03 kg/m2  EFM:  Baseline 135, currently minimal variability with no accels, no decels, Cat II  [place]: every 2-5min  SVE: 1.5/90%/0 per RN at 8:45am  Membranes:  intact    Assessment:  33 year old  @ 36w3d admitted for late  labor    Plan:  1. Admit to labor and delivery   2. PTL/labor: Cervical change since admission. No augmentation needed at this time, will recheck cervix in next hour. S/p IV fentanyl, OK for epidural if desired  3. GBS unknown: PCN infusing  4. S/p NICU consult  5. Hypothyroid: on Synthroid  6. S/p TDAP vaccine this pregnancy    Carmen Medel  2018  11:46 AM      "

## 2018-05-25 NOTE — ANESTHESIA PREPROCEDURE EVALUATION
Anesthesia Evaluation     .             ROS/MED HX    ENT/Pulmonary:       Neurologic:       Cardiovascular:  - neg cardiovascular ROS       METS/Exercise Tolerance:     Hematologic:         Musculoskeletal:         GI/Hepatic:         Renal/Genitourinary:         Endo:         Psychiatric:         Infectious Disease:         Malignancy:         Other:                     Physical Exam      Airway   Mallampati: I  TM distance: > 3 FB  Neck ROM: full  Mouth opening: > 3 cm    Dental     Cardiovascular       Pulmonary                     Anesthesia Plan      History & Physical Review  History and physical reviewed and following examination; no interval change.    ASA Status:  2 .  OB Epidural Asa: 2       Plan for Epidural          Postoperative Care      Consents  Anesthetic plan, risks, benefits and alternatives discussed with:  Patient and Patient..                          .

## 2018-05-26 LAB
HGB BLD-MCNC: 11.6 G/DL (ref 11.7–15.7)
T PALLIDUM AB SER QL: NONREACTIVE

## 2018-05-26 PROCEDURE — 25000132 ZZH RX MED GY IP 250 OP 250 PS 637: Performed by: OBSTETRICS & GYNECOLOGY

## 2018-05-26 PROCEDURE — 12000037 ZZH R&B POSTPARTUM INTERMEDIATE

## 2018-05-26 PROCEDURE — 25000128 H RX IP 250 OP 636: Performed by: OBSTETRICS & GYNECOLOGY

## 2018-05-26 PROCEDURE — 36415 COLL VENOUS BLD VENIPUNCTURE: CPT | Performed by: OBSTETRICS & GYNECOLOGY

## 2018-05-26 PROCEDURE — 85018 HEMOGLOBIN: CPT | Performed by: OBSTETRICS & GYNECOLOGY

## 2018-05-26 RX ORDER — OXYCODONE HYDROCHLORIDE 5 MG/1
10 TABLET ORAL
Status: DISCONTINUED | OUTPATIENT
Start: 2018-05-26 | End: 2018-05-27 | Stop reason: HOSPADM

## 2018-05-26 RX ORDER — HYDROMORPHONE HYDROCHLORIDE 1 MG/ML
0.2 INJECTION, SOLUTION INTRAMUSCULAR; INTRAVENOUS; SUBCUTANEOUS ONCE
Status: COMPLETED | OUTPATIENT
Start: 2018-05-26 | End: 2018-05-26

## 2018-05-26 RX ADMIN — NORTRIPTYLINE HYDROCHLORIDE 50 MG: 50 CAPSULE ORAL at 22:59

## 2018-05-26 RX ADMIN — OXYCODONE HYDROCHLORIDE 10 MG: 5 TABLET ORAL at 23:52

## 2018-05-26 RX ADMIN — OXYCODONE HYDROCHLORIDE 10 MG: 5 TABLET ORAL at 09:32

## 2018-05-26 RX ADMIN — IBUPROFEN 800 MG: 400 TABLET ORAL at 07:59

## 2018-05-26 RX ADMIN — IBUPROFEN 800 MG: 400 TABLET ORAL at 01:41

## 2018-05-26 RX ADMIN — HYDROMORPHONE HYDROCHLORIDE 0.2 MG: 1 INJECTION, SOLUTION INTRAMUSCULAR; INTRAVENOUS; SUBCUTANEOUS at 02:28

## 2018-05-26 RX ADMIN — ACETAMINOPHEN 650 MG: 325 TABLET ORAL at 05:27

## 2018-05-26 RX ADMIN — LEVOTHYROXINE SODIUM 175 MCG: 175 TABLET ORAL at 06:27

## 2018-05-26 RX ADMIN — IBUPROFEN 800 MG: 400 TABLET ORAL at 13:54

## 2018-05-26 RX ADMIN — ACETAMINOPHEN 650 MG: 325 TABLET ORAL at 00:57

## 2018-05-26 RX ADMIN — ACETAMINOPHEN 650 MG: 325 TABLET ORAL at 20:36

## 2018-05-26 RX ADMIN — SENNOSIDES AND DOCUSATE SODIUM 1 TABLET: 8.6; 5 TABLET ORAL at 07:59

## 2018-05-26 RX ADMIN — OXYCODONE HYDROCHLORIDE 10 MG: 5 TABLET ORAL at 06:27

## 2018-05-26 RX ADMIN — IBUPROFEN 800 MG: 400 TABLET ORAL at 20:36

## 2018-05-26 RX ADMIN — OXYCODONE HYDROCHLORIDE 10 MG: 5 TABLET ORAL at 15:28

## 2018-05-26 RX ADMIN — ACETAMINOPHEN 650 MG: 325 TABLET ORAL at 13:54

## 2018-05-26 RX ADMIN — OXYCODONE HYDROCHLORIDE 10 MG: 5 TABLET ORAL at 03:12

## 2018-05-26 RX ADMIN — SENNOSIDES AND DOCUSATE SODIUM 2 TABLET: 8.6; 5 TABLET ORAL at 20:36

## 2018-05-26 RX ADMIN — ACETAMINOPHEN 650 MG: 325 TABLET ORAL at 09:32

## 2018-05-26 NOTE — PROGRESS NOTES
Data: Dean Weathers transferred to [facility] via wheelchair at 2230. Baby transferred via parent's arms.  Action: Receiving unit notified of transfer: Yes. Patient and family notified of room change. Report given to Lorri Clemente at 2240. Belongings sent to receiving unit. Accompanied by Registered Nurse. Oriented patient to surroundings. Call light within reach. ID bands double-checked with receiving RN.  Response: Patient tolerated transfer and is stable.

## 2018-05-26 NOTE — PLAN OF CARE
Up to floor at 2230 oriented to room /unit resources folder given infant safety reviewed questions answered pain controlled with tylenol and motrin  Up ambulated to bathroom tolerated well  Unable to void starks placed  Urine output 500 cc hematoma labial area swollen brusied hard tender to touch  Notified MD assessed in person  Orders  applied  encourage to call with needs continue to monitor

## 2018-05-26 NOTE — PROGRESS NOTES
"OB Progress note:     S: Called to room to evaluate perineal swelling and possible hematoma. Pt's pain had been controlled, but was severe when she was up to bathroom, unable to void. Pt in tears with perineal pain and pressure.     O: /82  Pulse 64  Temp 97.7  F (36.5  C) (Oral)  Resp 18  Ht 1.626 m (5' 4\")  Wt 63.5 kg (140 lb)  SpO2 100%  Breastfeeding? Unknown  BMI 24.03 kg/m2    Perineum: Bilateral labia with swelling, posterior labia into posterior perineum bilaterally with bruising and swelling, c/w small hematoma, very tender to touch. No active bleeding noted.     A/P: PPD#1 s/p  at 36wks, 1st degree tear with left labial laceration. Now with what appears to be vulvar swelling vs hematoma.   - Oxycodone 10mg q3h prn  - Ice continuously  - Gupta catheter inserted, will leave in overnight    Carmen Medel MD  "

## 2018-05-26 NOTE — LACTATION NOTE
Initial visit. Baby 36+3 weeks gestation. Breastfeeding general information reviewed.   Advised to breastfeed exclusively, on demand, avoid pacifiers, bottles and formula unless medically indicated.  Encouraged rooming in, skin to skin, feeding on demand 8-12x/day or sooner if baby cues.  Explained benefits of holding and skin to skin.  Encouraged lots of skin to skin. Instructed on hand expression.    No further questions at this time.  Blood glucose had been low and baby had received  Gel 2 times.   Last 2 blood glucose checks have been good.  Mother is pumping and yielding 2-5 Ml.  Finger feeding colostrum with dropper.      Will follow as needed.   Rita Villarreal BSN, RN, PHN, RNC-MNN, IBCLC

## 2018-05-26 NOTE — PLAN OF CARE
Problem: Patient Care Overview  Goal: Plan of Care/Patient Progress Review  Outcome: No Change  Pain controlled with tylenol ,motrin and oxycodone 10 mg dilaudid IV 0.2 mg  given once per order fundus firm @U/1 scent flow perineum swollen  Labial hematoma  bruised  Hard and tender to touch ice pack applied continues starks draining good amount working on breast feeding pumping encouraged to call with needs continue to monitor

## 2018-05-26 NOTE — PLAN OF CARE
Problem: Patient Care Overview  Goal: Plan of Care/Patient Progress Review  Outcome: No Change  VSS, fundus firm, scant flow, starks draining adequately-per MD keep in until am, ambulating independently. Infant very sleepy and disinterested in breastfeeding, encouraged skin to skin and pumping every three hours. Continuing to work on pain control, had been taking tylenol, ibuprofen and oxycodone as scheduled now requesting when she wants meds. Will continue to monitor.

## 2018-05-26 NOTE — PROGRESS NOTES
"OB Post-partum Note  PPD# 1    S:  Patient doing well.  Pain controlled now with tylenol/ibuprofen & oxycodone. Received 1 dose of IV dilaudid overnight for severe perineal pain - vulvar swelling & hematoma.  Starks in place.  Bleeding is normal.  Pumping breastmilk.     O:  /84  Pulse 80  Temp 97.5  F (36.4  C) (Oral)  Resp 18  Ht 1.626 m (5' 4\")  Wt 63.5 kg (140 lb)  SpO2 100%  Breastfeeding? Unknown  BMI 24.03 kg/m2  Gen- A&O, NAD  Abd- Non-tender, fundus firm at umbilicus  Perineum intact, stable overnight with bilateral labia swollen and the appearance of a hematoma posteriorly, stable in size  Ext- non-tender, no edema, no leg or calf pain    Hemoglobin   Date Value Ref Range Status   2018 11.6 (L) 11.7 - 15.7 g/dL Final     A +  Rubella Immune    A/P: 33 year old  PPD# 1 s/p  at 36 weeks, postpartum course complicated by vulvar swelling & hematoma, inability to void overnight.     1.  Routine post-partum cares  2.  Analgesia, continue oxycodone as needed, ice to perineum.  3.  Plan to remove starks catheter this afternoon      Carmen Medel  2018  9:03 AM  "

## 2018-05-27 VITALS
WEIGHT: 140 LBS | HEIGHT: 64 IN | HEART RATE: 86 BPM | TEMPERATURE: 98.3 F | RESPIRATION RATE: 16 BRPM | SYSTOLIC BLOOD PRESSURE: 139 MMHG | DIASTOLIC BLOOD PRESSURE: 92 MMHG | OXYGEN SATURATION: 100 % | BODY MASS INDEX: 23.9 KG/M2

## 2018-05-27 PROCEDURE — 25000132 ZZH RX MED GY IP 250 OP 250 PS 637: Performed by: OBSTETRICS & GYNECOLOGY

## 2018-05-27 RX ORDER — OXYCODONE HYDROCHLORIDE 10 MG/1
5 TABLET ORAL
Qty: 10 TABLET | Refills: 0 | Status: SHIPPED | OUTPATIENT
Start: 2018-05-27 | End: 2018-09-21

## 2018-05-27 RX ORDER — AMOXICILLIN 250 MG
2 CAPSULE ORAL 2 TIMES DAILY PRN
Qty: 100 TABLET | COMMUNITY
Start: 2018-05-27 | End: 2018-09-21

## 2018-05-27 RX ORDER — IBUPROFEN 800 MG/1
800 TABLET, FILM COATED ORAL EVERY 6 HOURS PRN
Qty: 120 TABLET | COMMUNITY
Start: 2018-05-27 | End: 2020-11-17

## 2018-05-27 RX ORDER — ACETAMINOPHEN 325 MG/1
650 TABLET ORAL EVERY 4 HOURS PRN
Qty: 100 TABLET | COMMUNITY
Start: 2018-05-27 | End: 2020-11-17

## 2018-05-27 RX ADMIN — ACETAMINOPHEN 650 MG: 325 TABLET ORAL at 07:54

## 2018-05-27 RX ADMIN — ACETAMINOPHEN 650 MG: 325 TABLET ORAL at 13:00

## 2018-05-27 RX ADMIN — IBUPROFEN 800 MG: 400 TABLET ORAL at 07:54

## 2018-05-27 RX ADMIN — IBUPROFEN 800 MG: 400 TABLET ORAL at 13:50

## 2018-05-27 RX ADMIN — SENNOSIDES AND DOCUSATE SODIUM 2 TABLET: 8.6; 5 TABLET ORAL at 07:54

## 2018-05-27 RX ADMIN — LEVOTHYROXINE SODIUM 175 MCG: 175 TABLET ORAL at 05:55

## 2018-05-27 RX ADMIN — IBUPROFEN 800 MG: 400 TABLET ORAL at 02:12

## 2018-05-27 RX ADMIN — ACETAMINOPHEN 650 MG: 325 TABLET ORAL at 02:12

## 2018-05-27 NOTE — PLAN OF CARE
Problem: Patient Care Overview  Goal: Plan of Care/Patient Progress Review  Outcome: No Change  Pain controlled with tylenol and motrin occasionally oxycodone c/o headache ice pack and meds given seems helped  Labial haemato remain stable ice applied continues    Up ambulating  starks draining good  amount working on breast feeding and pumping encouraged to call with needs continue to monitor

## 2018-05-27 NOTE — ANESTHESIA POSTPROCEDURE EVALUATION
Patient: Dean Weathers    * No procedures listed *    Diagnosis:* No pre-op diagnosis entered *  Diagnosis Additional Information: No value filed.    Anesthesia Type:  Epidural    Note:  Anesthesia Post Evaluation    Patient location during evaluation: Bedside  Patient participation: Able to fully participate in evaluation  Level of consciousness: awake and alert  Pain management: adequate  Airway patency: patent  Cardiovascular status: acceptable and hemodynamically stable  Respiratory status: acceptable and unassisted  Hydration status: acceptable  PONV: none     Anesthetic complications: None    Comments: Patient doing well, ambulating without difficulty, denies any HA, paresthesias or complications associated with the epidural.          Last vitals:  Vitals:    05/27/18 0037 05/27/18 0212 05/27/18 0257   BP:      Pulse:      Resp: 18 18 18   Temp:      SpO2:            Electronically Signed By: Sukhi Grider MD  May 27, 2018  6:50 AM

## 2018-05-27 NOTE — PLAN OF CARE
Problem: Patient Care Overview  Goal: Plan of Care/Patient Progress Review  Outcome: Adequate for Discharge Date Met: 05/27/18  D: VSS, assessments WDL.  I: Pt received complete discharge paperwork and home medications as filled by discharge pharmacy -oxycodone. Pt was given times of last dose for all discharge medications in writing on discharge medication sheets. Discharge teaching included home medication, pain management activity restrictions, postpartum cares and symptoms of infection.   A: Discharge outcomes on care plan met. Mother states understanding and comfort with self and baby cares.  P: Pt discharged to home. Pt was discharged with baby, and bands checked at time of discharge. Pt was accompanied by , nurse and baby, and left with personal belongings. Home care sent. Pt to follow up with OB per MD order. Pt had no further questions at this time and no unmet needs were identified.

## 2018-05-27 NOTE — LACTATION NOTE
"Routine visit.  Baby latched onto the right breast with shield.  Baby has flanged lips, however was not on deeply enough as the pointed part was visible.  Repositioned and Baby in closer, placed a roll behind mother wrist /forearm and mother states \"that feels better\".  Mother is pumping and yielding 8ml.  Using the tube and syringe at breast.    Getting ready for discharge.  Plan: Watch for feeding cues and feed every 2-3 hours and/or on demand. Continue to use feeding log to track intake and appropriate voids and stools. Take feeding log to first follow up appointment or weight check. Encourage skin to skin to promote frequent feedings, thermoregulation and bonding. Follow-up with healthcare provider or lactation consultant for questions or concerns. No further questions at this time. Rita Vilalrreal BSN, RN, PHN, RNC-MNN, IBCLC      "

## 2018-05-27 NOTE — PROGRESS NOTES
"OB Post-partum Note  PPD# 2    S:  Patient doing well.  Pain controlled now with tylenol/ibuprofen & oxycodone. Received 1 dose of IV dilaudid overnight for severe perineal pain - vulvar swelling & hematoma.  Gupta in place.  Bleeding is normal.  Pumping breastmilk.     O:  /86  Pulse 73  Temp 98.1  F (36.7  C) (Oral)  Resp 18  Ht 1.626 m (5' 4\")  Wt 63.5 kg (140 lb)  SpO2 100%  Breastfeeding? Unknown  BMI 24.03 kg/m2  Gen- A&O, NAD  Abd- Non-tender, fundus firm at umbilicus  Perineum intact, stable overnight with bilateral labia swollen and the appearance of a hematoma posteriorly, stable in size  Ext- non-tender, no edema, no leg or calf pain    Hemoglobin   Date Value Ref Range Status   2018 11.6 (L) 11.7 - 15.7 g/dL Final     A +  Rubella Immune    A/P: 33 year old  PPD# 2 s/p  at 36 weeks, postpartum course complicated by vulvar swelling & hematoma, improving    1.  Routine post-partum cares  2.  Analgesia, continue oxycodone as needed, ice to perineum.  3.  D/c home today, f/u in 1 week for perineum check, Rx given for oxycodone      Carmen Medel  2018  9:03 AM  "

## 2018-05-29 ENCOUNTER — HOSPITAL ENCOUNTER (OUTPATIENT)
Facility: CLINIC | Age: 33
End: 2018-05-29
Payer: COMMERCIAL

## 2018-05-30 LAB — COPATH REPORT: NORMAL

## 2018-06-01 DIAGNOSIS — Z86.39 HISTORY OF CUSHING DISEASE: ICD-10-CM

## 2018-06-01 DIAGNOSIS — E03.8 OTHER SPECIFIED HYPOTHYROIDISM: ICD-10-CM

## 2018-06-01 LAB
CORTIS SERPL-MCNC: 15.8 UG/DL (ref 4–22)
T4 SERPL-MCNC: 14.6 UG/DL (ref 4.5–13.9)
TSH SERPL DL<=0.005 MIU/L-ACNC: 0.13 MU/L (ref 0.4–4)

## 2018-06-01 PROCEDURE — 82533 TOTAL CORTISOL: CPT

## 2018-06-01 PROCEDURE — 84443 ASSAY THYROID STIM HORMONE: CPT

## 2018-06-01 PROCEDURE — 36415 COLL VENOUS BLD VENIPUNCTURE: CPT

## 2018-06-01 PROCEDURE — 84436 ASSAY OF TOTAL THYROXINE: CPT

## 2018-06-02 ENCOUNTER — MYC MEDICAL ADVICE (OUTPATIENT)
Dept: ENDOCRINOLOGY | Facility: CLINIC | Age: 33
End: 2018-06-02

## 2018-06-03 DIAGNOSIS — E03.8 OTHER SPECIFIED HYPOTHYROIDISM: Primary | ICD-10-CM

## 2018-06-03 RX ORDER — LEVOTHYROXINE SODIUM 175 UG/1
175 TABLET ORAL DAILY
Qty: 90 TABLET | Refills: 3 | Status: SHIPPED | OUTPATIENT
Start: 2018-06-03 | End: 2018-09-21

## 2018-08-13 DIAGNOSIS — E03.8 OTHER SPECIFIED HYPOTHYROIDISM: ICD-10-CM

## 2018-08-13 PROCEDURE — 36415 COLL VENOUS BLD VENIPUNCTURE: CPT

## 2018-08-13 PROCEDURE — 84439 ASSAY OF FREE THYROXINE: CPT

## 2018-08-13 PROCEDURE — 84443 ASSAY THYROID STIM HORMONE: CPT

## 2018-08-14 LAB
T4 FREE SERPL-MCNC: 1.36 NG/DL (ref 0.76–1.46)
TSH SERPL DL<=0.005 MIU/L-ACNC: <0.01 MU/L (ref 0.4–4)

## 2018-09-10 ENCOUNTER — OFFICE VISIT (OUTPATIENT)
Dept: URGENT CARE | Facility: URGENT CARE | Age: 33
End: 2018-09-10
Payer: COMMERCIAL

## 2018-09-10 VITALS
TEMPERATURE: 98 F | OXYGEN SATURATION: 99 % | SYSTOLIC BLOOD PRESSURE: 104 MMHG | HEART RATE: 74 BPM | BODY MASS INDEX: 19.57 KG/M2 | WEIGHT: 114 LBS | DIASTOLIC BLOOD PRESSURE: 63 MMHG

## 2018-09-10 DIAGNOSIS — R30.0 DYSURIA: ICD-10-CM

## 2018-09-10 DIAGNOSIS — N30.00 ACUTE CYSTITIS WITHOUT HEMATURIA: Primary | ICD-10-CM

## 2018-09-10 DIAGNOSIS — R82.90 NONSPECIFIC FINDING ON EXAMINATION OF URINE: ICD-10-CM

## 2018-09-10 LAB
ALBUMIN UR-MCNC: NEGATIVE MG/DL
APPEARANCE UR: ABNORMAL
BACTERIA #/AREA URNS HPF: ABNORMAL /HPF
BILIRUB UR QL STRIP: NEGATIVE
COLOR UR AUTO: YELLOW
GLUCOSE UR STRIP-MCNC: NEGATIVE MG/DL
HGB UR QL STRIP: ABNORMAL
KETONES UR STRIP-MCNC: NEGATIVE MG/DL
LEUKOCYTE ESTERASE UR QL STRIP: ABNORMAL
NITRATE UR QL: NEGATIVE
PH UR STRIP: 6 PH (ref 5–7)
RBC #/AREA URNS AUTO: ABNORMAL /HPF
RENAL EPI CELLS #/AREA URNS HPF: ABNORMAL /HPF
SOURCE: ABNORMAL
SP GR UR STRIP: 1.01 (ref 1–1.03)
UROBILINOGEN UR STRIP-ACNC: 0.2 EU/DL (ref 0.2–1)
WBC #/AREA URNS AUTO: ABNORMAL /HPF

## 2018-09-10 PROCEDURE — 81001 URINALYSIS AUTO W/SCOPE: CPT | Performed by: PHYSICIAN ASSISTANT

## 2018-09-10 PROCEDURE — 87086 URINE CULTURE/COLONY COUNT: CPT | Performed by: FAMILY MEDICINE

## 2018-09-10 PROCEDURE — 99213 OFFICE O/P EST LOW 20 MIN: CPT | Performed by: FAMILY MEDICINE

## 2018-09-10 PROCEDURE — 87186 SC STD MICRODIL/AGAR DIL: CPT | Performed by: FAMILY MEDICINE

## 2018-09-10 PROCEDURE — 87088 URINE BACTERIA CULTURE: CPT | Performed by: FAMILY MEDICINE

## 2018-09-10 RX ORDER — SULFAMETHOXAZOLE/TRIMETHOPRIM 800-160 MG
1 TABLET ORAL 2 TIMES DAILY
Qty: 6 TABLET | Refills: 0 | Status: SHIPPED | OUTPATIENT
Start: 2018-09-10 | End: 2018-09-13

## 2018-09-10 NOTE — PROGRESS NOTES
Subjective:   Dean Weathers is a 33 year old female who presents for   Chief Complaint   Patient presents with     Urgent Care     pt in clinic to have eval for dysuria and odor of the urine.     Dysuria     Last UTI many years ago.   15 weeks postpartum and is breastfeeding.   Had intercourse with  and urinated afterwards.   Denies nausea/vomiting/flank pain/fevers.     PMHX/PSHX/MEDS/ALLERGIES/SHX/FHX reviewed in Epic.    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 Provider to review and confirm.)       Encounter for long-term (current) use of steroids 12/15/2005     Priority: Medium       Current Outpatient Prescriptions   Medication     sulfamethoxazole-trimethoprim (BACTRIM DS/SEPTRA DS) 800-160 MG per tablet     acetaminophen (TYLENOL) 325 MG tablet     ibuprofen (ADVIL/MOTRIN) 800 MG tablet     levothyroxine (SYNTHROID/LEVOTHROID) 175 MCG tablet     NORTRIPTYLINE HCL PO     oxyCODONE IR (ROXICODONE) 10 MG tablet     Prenatal Vit-Fe Fumarate-FA (PRENATAL MULTIVITAMIN PLUS IRON) 27-0.8 MG TABS per tablet     senna-docusate (SENOKOT-S;PERICOLACE) 8.6-50 MG per tablet     VITAMIN D, CHOLECALCIFEROL, PO     No current facility-administered medications for this visit.      ROS:  As above per HPI    Objective:   /63  Pulse 74  Temp 98  F (36.7  C) (Tympanic)  Wt 114 lb (51.7 kg)  SpO2 99%  BMI 19.57 kg/m2, Body mass index is 19.57  kg/(m^2).  Gen:  NAD, well-nourished, sitting in chair comfortably  HEENT: EOMI, sclera anicteric, Head normocephalic, ; nares patent; moist mucous membranes  Neck: trachea midline, no thyromegaly  CV:  Hemodynamically stable  Pulm:  no increased work of breathing  Extrem: no cyanosis, edema or clubbing  Skin: no obvious rashes or abnormalities  Psych: Euthymic, linear thoughts, normal rate of speech    Results for orders placed or performed in visit on 09/10/18   *UA reflex to Microscopic and Culture ([facility] [facility] (except [place] and [place])   Result Value Ref Range    Color Urine Yellow     Appearance Urine Slightly Cloudy     Glucose Urine Negative NEG^Negative mg/dL    Bilirubin Urine Negative NEG^Negative    Ketones Urine Negative NEG^Negative mg/dL    Specific Gravity Urine 1.010 1.003 - 1.035    Blood Urine Trace (A) NEG^Negative    pH Urine 6.0 5.0 - 7.0 pH    Protein Albumin Urine Negative NEG^Negative mg/dL    Urobilinogen Urine 0.2 0.2 - 1.0 EU/dL    Nitrite Urine Negative NEG^Negative    Leukocyte Esterase Urine Large (A) NEG^Negative    Source Midstream Urine    Urine Microscopic   Result Value Ref Range    WBC Urine  (A) OTO5^0 - 5 /HPF    RBC Urine O - 2 OTO2^O - 2 /HPF    Bacteria Urine Moderate (A) NEG^Negative /HPF    Renal Tub Fat Cell Few (A) NEG^Negative /HPF     Assessment & Plan:   Dean Weathers, 33 year old female who presents with:    Acute cystitis without hematuria  Will treat with bactrim as she is breastfeeding at this time. No signs of pyelonephritis. Normal vital signs. Follow-up if no improvement in 3 days  - sulfamethoxazole-trimethoprim (BACTRIM DS/SEPTRA DS) 800-160 MG per tablet  Dispense: 6 tablet; Refill: 0  - *UA reflex to Microscopic and Culture ([facility] [facility] (except [place] and [place])  - Urine Microscopic  - Urine Culture Aerobic Bacterial      Joni Davis MD   [facility]     Options for treatment and/or follow-up care  were reviewed with the patient. Dean Weathers and/or legal guardian was engaged and actively involved in the decision making process. Patient/guardian verbalized understanding of the options discussed and was satisfied with the final plan.

## 2018-09-10 NOTE — MR AVS SNAPSHOT
After Visit Summary   9/10/2018    Dean Weathers    MRN: [id number]           Patient Information     Date Of Birth          1985        Visit Information        Provider Department      9/10/2018 5:15 PM Joni Davis MD [facility]        Today's Diagnoses     Dysuria    -  1    Nonspecific finding on examination of urine        Acute cystitis without hematuria          Care Instructions    Drink at least 8 glasses of water a day    Bactrim twice daily for 3 days    If symptoms not resolving by day 3 please call for recommendation          Follow-ups after your visit        Your next 10 appointments already scheduled     Oct 30, 2018  4:40 PM CDT   (Arrive by 4:25 PM)   RETURN ENDOCRINE with Vivian George MD   [facility] ([facility] [place])    [address] [place] [address] [phone]              Who to contact     If you have questions or need follow up information about today's clinic visit or your schedule please contact [facility] directly at [phone].  Normal or non-critical lab and imaging results will be communicated to you by Bambuserhart, letter or phone within 4 business days after the clinic has received the results. If you do not hear from us within 7 days, please contact the clinic through Bambuserhart or phone. If you have a critical or abnormal lab result, we will notify you by phone as soon as possible.  Submit refill requests through [employer] or call your pharmacy and they will forward the refill request to us. Please allow 3 business days for your refill to be completed.          Additional Information About Your Visit        Bambuserhart Information     [employer] gives you secure access to your electronic health record. If you see a primary care provider, you can also send messages to your care team and make appointments. If you have questions, please call your primary  care clinic.  If you do not have a primary care provider, please call [phone] and they will assist you.        Care EveryWhere ID     This is your Care EveryWhere ID. This could be used by other organizations to access your [place] medical records  [id number]        Your Vitals Were     Pulse Temperature Pulse Oximetry BMI (Body Mass Index)          74 98  F (36.7  C) (Tympanic) 99% 19.57 kg/m2         Blood Pressure from Last 3 Encounters:   09/10/18 104/63   05/27/18 (!) 139/92   07/20/16 123/82    Weight from Last 3 Encounters:   09/10/18 114 lb (51.7 kg)   05/25/18 140 lb (63.5 kg)   07/20/16 127 lb 14.4 oz (58 kg)              We Performed the Following     *UA reflex to Microscopic and Culture ([place] and [facility] (except [place] and [place])     Urine Culture Aerobic Bacterial     Urine Microscopic          Today's Medication Changes          These changes are accurate as of 9/10/18  5:47 PM.  If you have any questions, ask your nurse or doctor.               Start taking these medicines.        Dose/Directions    sulfamethoxazole-trimethoprim 800-160 MG per tablet   Commonly known as:  BACTRIM DS/SEPTRA DS   Used for:  Acute cystitis without hematuria   Started by:  Joni Davis MD        Dose:  1 tablet   Take 1 tablet by mouth 2 times daily for 3 days   Quantity:  6 tablet   Refills:  0            Where to get your medicines      These medications were sent to [id number] - [place], [place] - [address] AT [facility] [address]  [address], [place] [address]     Phone:  [phone]     sulfamethoxazole-trimethoprim 800-160 MG per tablet                Primary Care Provider Office Phone # Fax #    Chrissy Shonda Lopez [phone]       [facility] [address]  [facility] [address]        Equal Access to Services     [facility] AH: Hadii leandra Chiu, waaxda luqtarsha, qaybta kaeliz sol  gael bazzimirna mclainaan ah. So [facility] [phone].    ATENCIÓN: Si kashif bull, tiene a pepe disposición servicios gratuitos de asistencia lingüística. Leah al [phone].    We comply with applicable federal civil rights laws and [place] laws. We do not discriminate on the basis of race, color, national origin, age, disability, sex, sexual orientation, or gender identity.            Thank you!     Thank you for choosing [facility]  for your care. Our goal is always to provide you with excellent care. Hearing back from our patients is one way we can continue to improve our services. Please take a few minutes to complete the written survey that you may receive in the mail after your visit with us. Thank you!             Your Updated Medication List - Protect others around you: Learn how to safely use, store and throw away your medicines at www.disposemymeds.org.          This list is accurate as of 9/10/18  5:47 PM.  Always use your most recent med list.                   Brand Name Dispense Instructions for use Diagnosis    acetaminophen 325 MG tablet    TYLENOL    100 tablet    Take 2 tablets (650 mg) by mouth every 4 hours as needed for mild pain or fever (greater than or equal to 38? C /100.4? F (oral) or 38.5? C/ 101.4? F (core).)     (spontaneous vaginal delivery)       ibuprofen 800 MG tablet    ADVIL/MOTRIN    120 tablet    Take 1 tablet (800 mg) by mouth every 6 hours as needed for other (cramping)     (spontaneous vaginal delivery)       levothyroxine 175 MCG tablet    SYNTHROID/LEVOTHROID    90 tablet    Take 1 tablet (175 mcg) by mouth daily    Other specified hypothyroidism       NORTRIPTYLINE HCL PO      Take 50 mg by mouth At Bedtime        oxyCODONE IR 10 MG tablet    ROXICODONE    10 tablet    Take 0.5 tablets (5 mg) by mouth every 3 hours as needed for moderate to severe pain     (spontaneous vaginal delivery)       prenatal multivitamin plus iron 27-0.8  MG Tabs per tablet      Take 1 tablet by mouth daily        senna-docusate 8.6-50 MG per tablet    SENOKOT-S;PERICOLACE    100 tablet    Take 2 tablets by mouth 2 times daily as needed for constipation     (spontaneous vaginal delivery)       sulfamethoxazole-trimethoprim 800-160 MG per tablet    BACTRIM DS/SEPTRA DS    6 tablet    Take 1 tablet by mouth 2 times daily for 3 days    Acute cystitis without hematuria       VITAMIN D (CHOLECALCIFEROL) PO      Take 1,000 Units by mouth daily

## 2018-09-10 NOTE — PATIENT INSTRUCTIONS
Drink at least 8 glasses of water a day    Bactrim twice daily for 3 days    If symptoms not resolving by day 3 please call for recommendation

## 2018-09-12 LAB
BACTERIA SPEC CULT: ABNORMAL
SPECIMEN SOURCE: ABNORMAL

## 2018-09-20 ENCOUNTER — APPOINTMENT (OUTPATIENT)
Dept: LAB | Facility: CLINIC | Age: 33
End: 2018-09-20
Payer: COMMERCIAL

## 2018-09-21 ENCOUNTER — VIRTUAL VISIT (OUTPATIENT)
Dept: ENDOCRINOLOGY | Facility: CLINIC | Age: 33
End: 2018-09-21
Payer: COMMERCIAL

## 2018-09-21 DIAGNOSIS — E03.8 OTHER SPECIFIED HYPOTHYROIDISM: ICD-10-CM

## 2018-09-21 DIAGNOSIS — Z86.39 HISTORY OF CUSHING DISEASE: ICD-10-CM

## 2018-09-21 DIAGNOSIS — R63.4 LOSS OF WEIGHT: Primary | ICD-10-CM

## 2018-09-21 RX ORDER — LEVOTHYROXINE SODIUM 175 UG/1
TABLET ORAL
Qty: 90 TABLET | Refills: 3 | Status: SHIPPED | OUTPATIENT
Start: 2018-09-21 | End: 2019-09-29

## 2018-09-21 NOTE — PATIENT INSTRUCTIONS
Hold levothyroxine for 3 days and then restart at reduced dose.  New dose will be 150 mcg/day which can be achieved with the 175 mcg tablets taken 6 days/week (skip sundays)  You will need follow up in about 2-3 months.  I will ask my staff to reschedule your appointment for then

## 2018-09-21 NOTE — PROGRESS NOTES
Telephone visit    Attending ASSESSMENT/PLAN:   1.  History of Cushings disease 2003.  It is difficult to assess her via telephone visit.  Schedule face to face for about 3 months from now.      Central hypothyroidism.   She is back on the pre-pregnancy dose of 175 mcg/day. She has lost weight, below pre-pregnancy  Hold LT4 x 3 days and then resume at 150 mcg/day (175 x 6 will also achieve same dose).  Follow up in about 3 months    Weight loss - as above    Lactating mother     START TIME 0830  STOP TIME 0838  TOTAL TIME: 8 minutes      Vivian George.       HISTORY OF PRESENT ILLNESS Dean presents for follow up of history of cushing disease, central hypothyroidism. I have seen her twice before, most recently 7/16.   .  There have been multiple mychart notes since then, both for virtual care and also taking her through pregnancy starting late 2017 and through 2018.  Today she says the pregnancy went very well.  The baby was born 3.5 weeks early.  She has been breast feeding     Dean has a history of Cushing disease in 2003. At that time she was age 17 and  presented with HA,secondary amenorrhea, weight gain, acne, low energy.  She was surgically treated 3/04 and 9/05 (the 2nd because of recurrent symptoms).  She took HC about one year following.     Cortrosyn stimulated test on 6/4/15 had normal results (cortisol 12.4 - 19.4- 22.7).  Midnight salivary cortisol 6/4/15 was normal at 0.05 mcg/dl.      Since our lst meeting I increased the LT4 dose from 150 to 175 mcg/day.  At our last appt she was on 175 mcg/day. We increased to 175 * 7.5/week, 2/12/18 during the pregnancy.  She notified us of delivery 6/3/18.  I recommended she reduce back to 175 mcg/day .  She was to have follow up labs in 3 months . Rather, she got the labs one month early on 8/13/18: TSH < 0.01, free T4 1.36.      weights  7/20/16: 127  9/10/18 114       We have the following data  11/19/03: TPO < 0.5, ACTH 14, cortisol 17.2  4/13 on L-T4  125 mcg/day  6/4/13: free T4 1.1, TSH 0.68 on 125 mcg/day?   7/31/13: IGF1 101, TSH 0.48, free T4 1.3  9/6/13: TSH < 0.03, free T4 1.7  11/8/13 on L-T4 175 mcg/day - dose reduced to 6.5 tablets/week  1/21/14: estradiol 41, FSH 7.9, LH 6, TSH 0.07, free T4 1.6, cortisol 8.2, prolactin 7.1  11/24/14: TSH < 0.03, free T4 1.8, free T3 3.6 on LT4 175 mcg/day  3/24/15: TSH < 0.03, free T4 1.7, cortisol 10.6  4/13/15: estradiol 33, FSH 10, LH 3, prolactin 8.2    We have the following images  6/24/17 pituitary MRI:  No pituitayr mass    REVIEW OF SYSTEMS  Weight loss is her main concern - why she contacted me  Eating normally - appetite normal  Sleeping normally -   Feels warmer than usual - used to be cold all the times; might wake up feeling heart  Has kind of a sore throat now  Cardiac: negative  Respiratory: exercise tolerance is OK -   GI: BM x 1/day or less; no abdominal pains; a lot of gas  Sometimes has a nervousness/ revved up feeling - sometimes she doesn't have this  Not exercising as much due to baby and work  Breast feeding  Strength is OK       Past Medical History  Past Medical History:   Diagnosis Date     Central hypothyroidism 2010     Cushing disease (H) 2003     Hx of previous reproductive problem     IVF pregnancy     Raynaud's syndrome      Unspecified endocrine disorder     Pituitary Tumor,Microadenoma      Transphenoidal surgery 3/04, 9/05  Secondary hypothyroidsim 8/10 - (Dx [place] 8/10; TSH 0.1, free T4 1.3)    Past Surgical History:   Procedure Laterality Date     C NONSPECIFIC PROCEDURE  3/04, 9/05    Mass on pituitary gland. 1/2 of gland removed     PITUITARY SURGERY  3/04 and 9/05    for cushing disease     wisdom teeth         Medications    Current Outpatient Prescriptions   Medication Sig Dispense Refill     levothyroxine (SYNTHROID/LEVOTHROID) 175 MCG tablet MON to SAT 1 tablet/day; SUN do not take 90 tablet 3     acetaminophen (TYLENOL) 325 MG tablet Take 2 tablets (650 mg) by mouth every 4  hours as needed for mild pain or fever (greater than or equal to 38  C /100.4  F (oral) or 38.5  C/ 101.4  F (core).) (Patient not taking: Reported on 9/10/2018) 100 tablet      ibuprofen (ADVIL/MOTRIN) 800 MG tablet Take 1 tablet (800 mg) by mouth every 6 hours as needed for other (cramping) (Patient not taking: Reported on 9/10/2018) 120 tablet      NORTRIPTYLINE HCL PO Take 50 mg by mouth At Bedtime       [DISCONTINUED] levothyroxine (SYNTHROID/LEVOTHROID) 175 MCG tablet Take 1 tablet (175 mcg) by mouth daily (Patient not taking: Reported on 9/10/2018) 90 tablet 3       Social History  Social History   Substance Use Topics     Smoking status: Never Smoker     Smokeless tobacco: Never Used     Alcohol use No       Physical Exam  Awake alert, responds appropriately to questions. Clear speech    ENDO THYROID LABS-[facility] [place] Latest Ref Rng & Units 8/13/2018 6/1/2018   TSH 0.40 - 4.00 mU/L <0.01 (L) 0.13 (L)   T4 TOTAL 4.5 - 13.9 ug/dL  14.6 (H)   T4 FREE 0.76 - 1.46 ng/dL 1.36      ENDO THYROID LABS-[facility] [place] Latest Ref Rng & Units 5/8/2018 1/23/2018   TSH 0.40 - 4.00 mU/L <0.01 (L) <0.01 (L)   T4 TOTAL 4.5 - 13.9 ug/dL 15.7 (H) 14.8 (H)   T4 FREE 0.76 - 1.46 ng/dL       ENDO THYROID LABS-[facility] [place] Latest Ref Rng & Units 12/4/2017 11/3/2017   TSH 0.40 - 4.00 mU/L <0.01 (L) <0.01 (L)   T4 TOTAL 4.5 - 13.9 ug/dL 14.2 (H) 16.2 (H)   T4 FREE 0.76 - 1.46 ng/dL       ENDO THYROID LABS-[facility] [place] Latest Ref Rng & Units 4/12/2017   TSH 0.40 - 4.00 mU/L <0.01 (L)   T4 TOTAL 4.5 - 13.9 ug/dL    T4 FREE 0.76 - 1.46 ng/dL 1.46       Brain/ Pituitary MRI without and with contrast     History: Patient has a history of Cushing's disease with removal of  pituitary tumor in 2004 and 2005     Comparison:  No prior similar studies      Technique: Axial diffusion and FLAIR images of the whole brain  obtained without intravenous contrast. Sagittal T1-weighted, coronal  T2-weighted, coronal T1-weighted images with focus on the sella were  obtained without  intravenous contrast. Post intravenous contrast using  gadolinium coronal and sagittal T1-weighted images were obtained  focused on the sella. Dynamic postcontrast coronal T1-weighted images  were also obtained.     Contrast: 5.5mL Gadavist     Findings:    No mass is demonstrated within the sella.There is decreased height of  the adenohypophysis centrally, likely postsurgical. The height of the  gland is normal peripherally. Normal T1 hyperintense neurohypophysis.  The pituitary stalk appears midline. The optic chiasm appears intact  and not displaced. The major cavernous carotid vascular flow-voids  appear patent.       Regarding the remainder of the brain, there is no mass, extra-axial  collection or midline shift. No abnormal foci of diffusion restriction  or contrast enhancement. There is T1 hyperintensity in bilateral basal  ganglia and dentate nuclei, could be secondary to mineralization or  gadolinium deposition. Single punctate focus of nonspecific T2  hyperintensity in the anterosuperior right frontal corona radiata,  within normal limits for age.     The visualized paranasal sinuses and the mastoid air cells are clear.          Impression:  1. No evidence of mass within the sella. Volume loss in the central  portion of the adenohypophysis, likely post surgical.  2. T1 hyperintensity of the basal ganglia and bilateral dentate  nuclei. This could be secondary to mineralization or gadolinium  deposition.      I have personally reviewed the examination and initial interpretation  and I agree with the findings.     CINTIA SPAULDING MD

## 2018-09-21 NOTE — MR AVS SNAPSHOT
After Visit Summary   9/21/2018    Dean Weathers    MRN: [id number]           Patient Information     Date Of Birth          1985        Visit Information        Provider Department      9/21/2018 8:30 AM Vivian George MD [facility]        Today's Diagnoses     Loss of weight    -  1    central hypothyroidism        History of Cushing disease        Lactating mother          Care Instructions    Hold levothyroxine for 3 days and then restart at reduced dose.  New dose will be 150 mcg/day which can be achieved with the 175 mcg tablets taken 6 days/week (skip sundays)  You will need follow up in about 2-3 months.  I will ask my staff to reschedule your appointment for then          Follow-ups after your visit        Your next 10 appointments already scheduled     Oct 30, 2018  4:40 PM CDT   (Arrive by 4:25 PM)   RETURN ENDOCRINE with Vivian George MD   [facility] ([facility])    [address] [place] [address] [phone]              Who to contact     Please call your clinic at [phone] to:    Ask questions about your health    Make or cancel appointments    Discuss your medicines    Learn about your test results    Speak to your doctor            Additional Information About Your Visit        LPATHharSAW Instrument Information     [employer] gives you secure access to your electronic health record. If you see a primary care provider, you can also send messages to your care team and make appointments. If you have questions, please call your primary care clinic.  If you do not have a primary care provider, please call [phone] and they will assist you.      [employer] is an electronic gateway that provides easy, online access to your medical records. With [employer], you can request a clinic appointment, read your test results, renew a prescription or communicate with your care team.     To access your existing account,  please contact your [facility] or call [phone] for assistance.        Care EveryWhere ID     This is your Care EveryWhere ID. This could be used by other organizations to access your [place] medical records  [id number]         Blood Pressure from Last 3 Encounters:   09/10/18 104/63   05/27/18 (!) 139/92   07/20/16 123/82    Weight from Last 3 Encounters:   09/10/18 51.7 kg (114 lb)   05/25/18 63.5 kg (140 lb)   07/20/16 58 kg (127 lb 14.4 oz)              Today, you had the following     No orders found for display         Today's Medication Changes          These changes are accurate as of 9/21/18  8:45 AM.  If you have any questions, ask your nurse or doctor.               These medicines have changed or have updated prescriptions.        Dose/Directions    levothyroxine 175 MCG tablet   Commonly known as:  SYNTHROID/LEVOTHROID   This may have changed:    - how much to take  - how to take this  - when to take this  - additional instructions   Used for:  Other specified hypothyroidism, Loss of weight, History of Cushing disease        MON to SAT 1 tablet/day; SUN do not take   Quantity:  90 tablet   Refills:  3            Where to get your medicines      These medications [id number] - [place], [place] - [address] AT [facility] [address]  [address], [place] [address]    Hours:  24-hours Phone:  [phone]     levothyroxine 175 MCG tablet                Primary Care Provider Office Phone # Fax #    Chrissy Shonda Lopez [phone]       [facility] [address] [place] [facility] [address]  [facility] [address]        Equal Access to Services     LINDA MASSEY AH: Hadii leandra lunao Sopadma, waaxda luqadaha, qaybta kaalmada adeegyada, eliz hutchison. So [facility] [phone].    ATENCIÓN: Si habla español, tiene a pepe disposición servicios gratuitos de asistencia lingüística. Llame al  [phone].    We comply with applicable federal civil rights laws and [place] laws. We do not discriminate on the basis of race, color, national origin, age, disability, sex, sexual orientation, or gender identity.            Thank you!     Thank you for choosing [facility]  for your care. Our goal is always to provide you with excellent care. Hearing back from our patients is one way we can continue to improve our services. Please take a few minutes to complete the written survey that you may receive in the mail after your visit with us. Thank you!             Your Updated Medication List - Protect others around you: Learn how to safely use, store and throw away your medicines at www.disposemymeds.org.          This list is accurate as of 18  8:45 AM.  Always use your most recent med list.                   Brand Name Dispense Instructions for use Diagnosis    acetaminophen 325 MG tablet    TYLENOL    100 tablet    Take 2 tablets (650 mg) by mouth every 4 hours as needed for mild pain or fever (greater than or equal to 38? C /100.4? F (oral) or 38.5? C/ 101.4? F (core).)     (spontaneous vaginal delivery)       ibuprofen 800 MG tablet    ADVIL/MOTRIN    120 tablet    Take 1 tablet (800 mg) by mouth every 6 hours as needed for other (cramping)     (spontaneous vaginal delivery)       levothyroxine 175 MCG tablet    SYNTHROID/LEVOTHROID    90 tablet    MON to SAT 1 tablet/day; SUN do not take    Other specified hypothyroidism, Loss of weight, History of Cushing disease       NORTRIPTYLINE HCL PO      Take 50 mg by mouth At Bedtime

## 2018-11-07 ENCOUNTER — MYC MEDICAL ADVICE (OUTPATIENT)
Dept: ENDOCRINOLOGY | Facility: CLINIC | Age: 33
End: 2018-11-07

## 2018-11-07 DIAGNOSIS — R53.83 FATIGUE, UNSPECIFIED TYPE: ICD-10-CM

## 2018-11-07 DIAGNOSIS — E03.8 OTHER SPECIFIED HYPOTHYROIDISM: Primary | ICD-10-CM

## 2018-11-08 DIAGNOSIS — E03.8 OTHER SPECIFIED HYPOTHYROIDISM: ICD-10-CM

## 2018-11-08 DIAGNOSIS — R53.83 FATIGUE, UNSPECIFIED TYPE: ICD-10-CM

## 2018-11-08 LAB
HGB BLD-MCNC: 12.8 G/DL (ref 11.7–15.7)
T4 FREE SERPL-MCNC: 1.41 NG/DL (ref 0.76–1.46)
TSH SERPL DL<=0.005 MIU/L-ACNC: <0.01 MU/L (ref 0.4–4)

## 2018-11-08 PROCEDURE — 84443 ASSAY THYROID STIM HORMONE: CPT

## 2018-11-08 PROCEDURE — 36415 COLL VENOUS BLD VENIPUNCTURE: CPT

## 2018-11-08 PROCEDURE — 84439 ASSAY OF FREE THYROXINE: CPT

## 2018-11-08 PROCEDURE — 85018 HEMOGLOBIN: CPT

## 2019-05-01 ENCOUNTER — MYC MEDICAL ADVICE (OUTPATIENT)
Dept: ENDOCRINOLOGY | Facility: CLINIC | Age: 34
End: 2019-05-01

## 2019-05-01 DIAGNOSIS — E03.8 OTHER SPECIFIED HYPOTHYROIDISM: Primary | ICD-10-CM

## 2019-05-02 DIAGNOSIS — E03.8 OTHER SPECIFIED HYPOTHYROIDISM: ICD-10-CM

## 2019-05-02 LAB
T4 FREE SERPL-MCNC: 1.31 NG/DL (ref 0.76–1.46)
TSH SERPL DL<=0.005 MIU/L-ACNC: <0.01 MU/L (ref 0.4–4)

## 2019-05-02 PROCEDURE — 84443 ASSAY THYROID STIM HORMONE: CPT | Performed by: FAMILY MEDICINE

## 2019-05-02 PROCEDURE — 84439 ASSAY OF FREE THYROXINE: CPT | Performed by: FAMILY MEDICINE

## 2019-05-02 PROCEDURE — 36415 COLL VENOUS BLD VENIPUNCTURE: CPT | Performed by: FAMILY MEDICINE

## 2019-09-03 DIAGNOSIS — R63.4 WEIGHT LOSS: ICD-10-CM

## 2019-09-03 DIAGNOSIS — E03.8 OTHER SPECIFIED HYPOTHYROIDISM: Primary | ICD-10-CM

## 2019-09-05 DIAGNOSIS — R63.4 WEIGHT LOSS: ICD-10-CM

## 2019-09-05 DIAGNOSIS — E03.8 OTHER SPECIFIED HYPOTHYROIDISM: ICD-10-CM

## 2019-09-05 PROCEDURE — 84439 ASSAY OF FREE THYROXINE: CPT

## 2019-09-05 PROCEDURE — 84443 ASSAY THYROID STIM HORMONE: CPT

## 2019-09-05 PROCEDURE — 36415 COLL VENOUS BLD VENIPUNCTURE: CPT

## 2019-09-06 LAB
T4 FREE SERPL-MCNC: 1.48 NG/DL (ref 0.76–1.46)
TSH SERPL DL<=0.005 MIU/L-ACNC: <0.01 MU/L (ref 0.4–4)

## 2019-09-27 DIAGNOSIS — R63.4 LOSS OF WEIGHT: ICD-10-CM

## 2019-09-27 DIAGNOSIS — Z86.39 HISTORY OF CUSHING DISEASE: ICD-10-CM

## 2019-09-27 DIAGNOSIS — E03.8 OTHER SPECIFIED HYPOTHYROIDISM: ICD-10-CM

## 2019-09-29 RX ORDER — LEVOTHYROXINE SODIUM 175 UG/1
TABLET ORAL
Qty: 90 TABLET | Refills: 0 | Status: SHIPPED | OUTPATIENT
Start: 2019-09-29 | End: 2019-12-12 | Stop reason: ALTCHOICE

## 2019-09-29 NOTE — TELEPHONE ENCOUNTER
levothyroxine (SYNTHROID/LEVOTHROID) 175 MCG tablet    Last Written Prescription Date:  9/21/18  Last Fill Quantity: 90,   # refills: 3  Last Office Visit : 9/21/18  Virtual visit  Future Office visit:  None    Routing refill request to provider for review/approval because: provider preference. tsh

## 2019-10-03 ENCOUNTER — HEALTH MAINTENANCE LETTER (OUTPATIENT)
Age: 34
End: 2019-10-03

## 2019-12-05 DIAGNOSIS — E03.8 OTHER SPECIFIED HYPOTHYROIDISM: Primary | ICD-10-CM

## 2019-12-06 DIAGNOSIS — E03.8 OTHER SPECIFIED HYPOTHYROIDISM: ICD-10-CM

## 2019-12-06 LAB
T4 FREE SERPL-MCNC: 1.39 NG/DL (ref 0.76–1.46)
TSH SERPL DL<=0.005 MIU/L-ACNC: <0.01 MU/L (ref 0.4–4)

## 2019-12-06 PROCEDURE — 84439 ASSAY OF FREE THYROXINE: CPT

## 2019-12-06 PROCEDURE — 36415 COLL VENOUS BLD VENIPUNCTURE: CPT

## 2019-12-06 PROCEDURE — 84443 ASSAY THYROID STIM HORMONE: CPT

## 2019-12-10 ENCOUNTER — TELEPHONE (OUTPATIENT)
Dept: ENDOCRINOLOGY | Facility: CLINIC | Age: 34
End: 2019-12-10

## 2019-12-10 NOTE — TELEPHONE ENCOUNTER
Dean is the one that called ahead asking for labs to be done due to constipation she has been experiencing . I am working on getting her scheduled in clinic  but theres nothing available when she can come. Will work on telephone visit in January. She conformed that she has been taking levothyroxine 150 mcg daily as confirmed and reported . Labs done but you only commented to get her scheduled. Do the results require a dose change at this time ? Sharon Mccullough RN on 12/10/2019 at 2:57 PM

## 2019-12-11 NOTE — TELEPHONE ENCOUNTER
Please contact Dean and schedule first available telephone call. I offered Mondays open with Ariel but she cannot make that. Telephone visit will be fine. Sharon Mccullough RN on 12/11/2019 at 11:39 AM

## 2019-12-11 NOTE — TELEPHONE ENCOUNTER
She is too complex to assess simply by labs or without exam.  She needs to be seen.  Telephone visit won't work for her.  I told her this in 2018.  Vivian George

## 2019-12-12 DIAGNOSIS — E03.8 OTHER SPECIFIED HYPOTHYROIDISM: ICD-10-CM

## 2019-12-12 DIAGNOSIS — R63.4 LOSS OF WEIGHT: ICD-10-CM

## 2019-12-12 DIAGNOSIS — Z86.39 HISTORY OF CUSHING DISEASE: ICD-10-CM

## 2019-12-12 RX ORDER — LEVOTHYROXINE SODIUM 175 UG/1
TABLET ORAL
Qty: 90 TABLET | Refills: 1 | Status: CANCELLED | OUTPATIENT
Start: 2019-12-12

## 2019-12-12 RX ORDER — LEVOTHYROXINE SODIUM 150 UG/1
150 TABLET ORAL DAILY
Qty: 90 TABLET | Refills: 1 | Status: SHIPPED | OUTPATIENT
Start: 2019-12-12 | End: 2020-06-24

## 2019-12-17 NOTE — TELEPHONE ENCOUNTER
Dean left a message that she will need to call and schedule the follow up with Dr George as first available . She is unable to come in  sooner with what was offered and it needs to be a face to face visit no telephone . Sharon Mccullough RN on 12/17/2019 at 9:02 AM  .

## 2020-02-03 ENCOUNTER — TELEPHONE (OUTPATIENT)
Dept: ENDOCRINOLOGY | Facility: CLINIC | Age: 35
End: 2020-02-03

## 2020-02-03 NOTE — TELEPHONE ENCOUNTER
[facility]    Phone Message    May a detailed message be left on voicemail: yes    Reason for Call: patient called in to schedule a follow up with Dr. George, next available isn't until 8/2020, patient feels that she needs to be seen sooner as she has not seen her since 2018 and Dr wants to see her in clinic vs a telephone call     Please advise       Action Taken: Message routed to:  [facility] endo

## 2020-02-03 NOTE — TELEPHONE ENCOUNTER
I do recommend that she have a face to face visit but she could also schedule a telephone visit to tide her over until she can get in.  As you know we often have openings appear on short notice that she could also take.  Vivian George MD

## 2020-02-03 NOTE — TELEPHONE ENCOUNTER
Sharon's mychart note stated you needed to see her in clinic versus telephone. Is this correct? We will place her on waitlist for a sooner return but I wanted to confirm that telephone wasn't appropriate.

## 2020-02-11 NOTE — TELEPHONE ENCOUNTER
Pt is scheduled for a RTC appointment, sent MyC ms letting patient know. Informed patient that Dr. George could see her in a telephone visit if she does not want to wait for scheduled appointment.

## 2020-05-04 ENCOUNTER — VIRTUAL VISIT (OUTPATIENT)
Dept: ENDOCRINOLOGY | Facility: CLINIC | Age: 35
End: 2020-05-04
Payer: COMMERCIAL

## 2020-05-04 DIAGNOSIS — Z86.39 HISTORY OF CUSHING DISEASE: Primary | ICD-10-CM

## 2020-05-04 DIAGNOSIS — Z30.09 FAMILY PLANNING: ICD-10-CM

## 2020-05-04 DIAGNOSIS — E03.8 OTHER SPECIFIED HYPOTHYROIDISM: ICD-10-CM

## 2020-05-04 DIAGNOSIS — R23.2 FACIAL FLUSHING: ICD-10-CM

## 2020-05-04 NOTE — PATIENT INSTRUCTIONS
The lab will provide supplies for the 24 hour urine and salivary cortisol    Get me the surgical pathology and operative reports from the past if you can    The salivary test should be collected at midnight    You should see me approximately yearly.  Let us know if you become pregnant as we would monitor more closely during pregnancy.

## 2020-05-04 NOTE — PROGRESS NOTES
"Dean Weathers is a 35 year old female who is being evaluated via a billable video visit.      The patient has been notified of following:     \"This video visit will be conducted via a call between you and your physician/provider. We have found that certain health care needs can be provided without the need for an in-person physical exam.  This service lets us provide the care you need with a video conversation.  If a prescription is necessary we can send it directly to your pharmacy.  If lab work is needed we can place an order for that and you can then stop by our lab to have the test done at a later time.    Video visits are billed at different rates depending on your insurance coverage.  Please reach out to your insurance provider with any questions.    If during the course of the call the physician/provider feels a video visit is not appropriate, you will not be charged for this service.\"    Patient has given verbal consent for Video visit? Yes    How would you like to obtain your AVS? Mare    Patient would like the video invitation sent by: Send to e-mail at: [email or web address]    Will anyone else be joining your video visit? No        "

## 2020-05-04 NOTE — PROGRESS NOTES
Endocrinology Attending video visit Physician Progress note    Attending ASSESSMENT/PLAN:   1.  History of Cushings disease 2003. Her visits should allow the physician to see her face due to this history - either face to face of video visits.  She has obvious flushing on exam today but doesn't look cushingoid.   midnith salivary cortisol  24 hour UFC  Get me records from pituitary surgery if available  I will be interested to see if she gets periods off the oCP     Central hypothyroidism.       Facial flushing vs suntan.  She is a known flusher/ blusher    Family planning - she used assisted reproduction for the last pregnancy and is hoping for another soon.  She is off OCP at this time.  It is too soon to know if she will get periods.    Due to the COVID 19 pandemic this visit was vidoe visit in order to help prevent spread of infection in this high risk patient and the general population. The patient gave verbal consent for the telephone visit today.    Start time 1443. When she arrived there was figure of a camera with a line through it.  She couldn't be seen or heard but she was able to chat with me via the chat function.  She tried again and the same thing happened. We closed [employer] and I sent request through [employer] which we used.    Stop time 1509  Total time 26 minutes    Vivian George.       HISTORY OF PRESENT ILLNESS Dean presents for follow up of history of cushing disease, central hypothyroidism. I have seen her most recently 9/2018. There have been multiple mychart notes since then.  At our last visit she was post partum, lactating.  There had been a significant weight loss to 114 lbs.  11/29/19 weight was 115 lbs.      Dean has a history of Cushing disease in 2003, age 17, presenting with HA,secondary amenorrhea, weight gain, acne, low energy.  She was surgically treated 3/04 and 9/05 (the 2nd because of recurrent symptoms).  She took HC about one year following the surgery.     Cortrosyn  stimulated test on 6/4/15 had normal results (cortisol 12.4 - 19.4- 22.7).  Midnight salivary cortisol 6/4/15 was normal at 0.05 mcg/dl.       May 2019 she reported she was on 175 x 6/week (150 mc/gday average).    She is currently on 150 mcg/day. The most recent TFTS were 12/6/19 TSH < 0.01, free T4 1.39    weights  7/20/16: 127  9/10/18 114  11/29/19 115 lbs     See my 2018 note for labs between 11/19/03 and 4/13/15.    We have the following images  6/24/17 pituitary MRI:  No pituitaty mass    REVIEW OF SYSTEMS  Weight is still around 115  Energy is overall pretty good; some days really sluggish, almost foggy  Sleep at night is sometimes great and other times she wakes up often and is hot at night.  She is a light sleeper - has to wear earplugs, have black out curtains.   Skin color looks flushed/blushed int he face - she explains this as being outside a lot this weekend. She is a flusher/blusher; ? Also has raynauds on hands and feet.    GI: issues since around Thanksjeffving; she ahd to go to urgent care due to constipation around Thanksgiving.  She saw gastroenterologist who suggested she use daily miralax.  Sometimes has nausea; other days has multiple BMs  No longer lactating  Headaches overall have been pretty good; had been on nortriptylline for this but stopped it to see hwo she is without it and she is OK without it.  She uses tordal injections 3-5 times/month  Strength is OK   She conceived by IVF and plans to begin that again in the spring of [address]25.  Her fertility clinic is currently closed. She hasn't been on the OCP x 1 month.  She hasn't had a period since stopping it.   Sometimes hot at night? Since off the OCP  No hot flashes  No sweats  Sometimes attention/ focus is less than optima.    Past Medical History  Past Medical History:   Diagnosis Date     Central hypothyroidism 2010     Cushing disease (H) 2003     Hx of previous reproductive problem     IVF pregnancy     Raynaud's syndrome       Unspecified endocrine disorder     Pituitary Tumor,Microadenoma     Past Surgical History:   Procedure Laterality Date     C NONSPECIFIC PROCEDURE  3/04, 9/05    Mass on pituitary gland. 1/2 of gland removed     PITUITARY SURGERY  3/04 and 9/05    for cushing disease     wisdom teeth       Medications    Current Outpatient Medications   Medication Sig Dispense Refill     levothyroxine (SYNTHROID/LEVOTHROID) 150 MCG tablet Take 1 tablet (150 mcg) by mouth daily 90 tablet 1     acetaminophen (TYLENOL) 325 MG tablet Take 2 tablets (650 mg) by mouth every 4 hours as needed for mild pain or fever (greater than or equal to 38  C /100.4  F (oral) or 38.5  C/ 101.4  F (core).) (Patient not taking: Reported on 9/10/2018) 100 tablet      ibuprofen (ADVIL/MOTRIN) 800 MG tablet Take 1 tablet (800 mg) by mouth every 6 hours as needed for other (cramping) (Patient not taking: Reported on 9/10/2018) 120 tablet      NORTRIPTYLINE HCL PO Take 50 mg by mouth At Bedtime         Social History  Social History     Tobacco Use     Smoking status: Never Smoker     Smokeless tobacco: Never Used   Substance Use Topics     Alcohol use: No     Drug use: No     Has toddler; exercises regularly; 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 Clear speech  GENERAL: healthy, alert and no distress  SKIN: facial flushing/? [place] vs sun burn.    EYES: Eyes grossly normal to inspection, conjunctivae and sclerae normal  RESP: no audible wheeze, cough, or visible cyanosis.  No visible retractions or increased work of breathing.  Able to speak fully in complete sentences.  NEURO: Cranial nerves grossly intact, mentation intact and speech normal  PSYCH: mentation appears normal, affect normal/bright, judgement and insight intact, normal speech and appearance well-groomed       Ref. Range 9/10/2018 17:06 11/8/2018 10:52 5/2/2019 10:35  9/5/2019 15:59 12/6/2019 10:42   T4 Free Latest Ref Range: 0.76 - 1.46 ng/dL  1.41 1.31 1.48 (H) 1.39   TSH Latest Ref Range: 0.40 - 4.00 mU/L  <0.01 (L) <0.01 (L) <0.01 (L) <0.01 (L)

## 2020-05-08 ENCOUNTER — APPOINTMENT (OUTPATIENT)
Dept: LAB | Facility: CLINIC | Age: 35
End: 2020-05-08
Payer: COMMERCIAL

## 2020-05-14 ENCOUNTER — APPOINTMENT (OUTPATIENT)
Dept: LAB | Facility: CLINIC | Age: 35
End: 2020-05-14
Payer: COMMERCIAL

## 2020-05-15 DIAGNOSIS — Z86.39 HISTORY OF CUSHING DISEASE: ICD-10-CM

## 2020-05-15 LAB
COLLECT DURATION TIME UR: 24 H
CREAT 24H UR-MRATE: 1.25 G/(24.H) (ref 0.8–1.8)
CREAT UR-MCNC: 30 MG/DL
SPECIMEN VOL UR: 4100 ML

## 2020-05-15 PROCEDURE — 82570 ASSAY OF URINE CREATININE: CPT

## 2020-05-15 PROCEDURE — 82530 CORTISOL FREE: CPT | Mod: 90

## 2020-05-15 PROCEDURE — 81050 URINALYSIS VOLUME MEASURE: CPT

## 2020-05-15 PROCEDURE — 99000 SPECIMEN HANDLING OFFICE-LAB: CPT

## 2020-05-19 LAB
COLLECT DURATION TIME SPEC: 24 H
CORTIS F 24H UR HPLC-MCNC: 7.01 UG/L
CORTIS F 24H UR-MRATE: 28.7 UG/D
CORTIS F/CREAT 24H UR: 23.37 UG/G CRT
CREAT 24H UR-MRATE: 1230 MG/D (ref 700–1600)
CREAT UR-MCNC: 30 MG/DL
IMP & REVIEW OF LAB RESULTS: NORMAL
SPECIMEN VOL ?TM UR: 4100 ML

## 2020-06-24 DIAGNOSIS — E03.8 OTHER SPECIFIED HYPOTHYROIDISM: ICD-10-CM

## 2020-06-24 RX ORDER — LEVOTHYROXINE SODIUM 150 UG/1
150 TABLET ORAL DAILY
Qty: 90 TABLET | Refills: 3 | Status: SHIPPED | OUTPATIENT
Start: 2020-06-24 | End: 2020-09-11

## 2020-06-24 NOTE — TELEPHONE ENCOUNTER
levothyroxine (SYNTHROID/LEVOTHROID) 150 MCG tablet       Last Written Prescription Date:  12-12-19  Last Fill Quantity: 90,   # refills: 1  Last Office Visit : 5-4-2020  Future Office visit:  None    TSH   Date Value Ref Range Status   12/06/2019 <0.01 (L) 0.40 - 4.00 mU/L Final         Routing refill request to provider for review/approval because:  Provider preference.         Kathleen M Doege RN

## 2020-09-09 DIAGNOSIS — Z86.39 HISTORY OF CUSHING DISEASE: ICD-10-CM

## 2020-09-09 DIAGNOSIS — Z33.1 PREGNANCY, INCIDENTAL: ICD-10-CM

## 2020-09-09 DIAGNOSIS — E03.8 OTHER SPECIFIED HYPOTHYROIDISM: Primary | ICD-10-CM

## 2020-09-11 DIAGNOSIS — Z33.1 PREGNANCY, INCIDENTAL: Primary | ICD-10-CM

## 2020-09-11 DIAGNOSIS — E03.8 OTHER SPECIFIED HYPOTHYROIDISM: ICD-10-CM

## 2020-09-11 DIAGNOSIS — Z86.39 HISTORY OF CUSHING DISEASE: ICD-10-CM

## 2020-09-11 DIAGNOSIS — Z33.1 PREGNANCY, INCIDENTAL: ICD-10-CM

## 2020-09-11 LAB
CORTIS SERPL-MCNC: 20 UG/DL (ref 4–22)
T4 SERPL-MCNC: 12.8 UG/DL (ref 4.5–13.9)
TSH SERPL DL<=0.005 MIU/L-ACNC: <0.01 MU/L (ref 0.4–4)

## 2020-09-11 PROCEDURE — 82533 TOTAL CORTISOL: CPT

## 2020-09-11 PROCEDURE — 84436 ASSAY OF TOTAL THYROXINE: CPT

## 2020-09-11 PROCEDURE — 36415 COLL VENOUS BLD VENIPUNCTURE: CPT

## 2020-09-11 PROCEDURE — 84443 ASSAY THYROID STIM HORMONE: CPT

## 2020-09-11 RX ORDER — LEVOTHYROXINE SODIUM 150 UG/1
TABLET ORAL
Qty: 96 TABLET | Refills: 3 | Status: SHIPPED | OUTPATIENT
Start: 2020-09-11 | End: 2020-09-11

## 2020-09-11 RX ORDER — LEVOTHYROXINE SODIUM 150 UG/1
TABLET ORAL
Qty: 96 TABLET | Refills: 3 | Status: SHIPPED | OUTPATIENT
Start: 2020-09-11 | End: 2020-11-17

## 2020-10-19 DIAGNOSIS — Z33.1 PREGNANCY, INCIDENTAL: ICD-10-CM

## 2020-10-19 DIAGNOSIS — Z86.39 HISTORY OF CUSHING DISEASE: ICD-10-CM

## 2020-10-19 DIAGNOSIS — E03.8 OTHER SPECIFIED HYPOTHYROIDISM: ICD-10-CM

## 2020-10-19 LAB
CORTIS SERPL-MCNC: 12.1 UG/DL (ref 4–22)
T4 SERPL-MCNC: 14.9 UG/DL (ref 4.5–13.9)
TSH SERPL DL<=0.005 MIU/L-ACNC: <0.01 MU/L (ref 0.4–4)

## 2020-10-19 PROCEDURE — 84443 ASSAY THYROID STIM HORMONE: CPT

## 2020-10-19 PROCEDURE — 84436 ASSAY OF TOTAL THYROXINE: CPT

## 2020-10-19 PROCEDURE — 36415 COLL VENOUS BLD VENIPUNCTURE: CPT

## 2020-10-19 PROCEDURE — 82533 TOTAL CORTISOL: CPT

## 2020-11-16 ENCOUNTER — PRE VISIT (OUTPATIENT)
Dept: MATERNAL FETAL MEDICINE | Facility: CLINIC | Age: 35
End: 2020-11-16

## 2020-11-16 DIAGNOSIS — Z33.1 PREGNANCY, INCIDENTAL: ICD-10-CM

## 2020-11-16 DIAGNOSIS — Z86.39 HISTORY OF CUSHING DISEASE: ICD-10-CM

## 2020-11-16 DIAGNOSIS — E03.8 OTHER SPECIFIED HYPOTHYROIDISM: ICD-10-CM

## 2020-11-16 LAB
CORTIS SERPL-MCNC: 19.2 UG/DL (ref 4–22)
T4 SERPL-MCNC: 13.3 UG/DL (ref 4.5–13.9)
TSH SERPL DL<=0.005 MIU/L-ACNC: <0.01 MU/L (ref 0.4–4)

## 2020-11-16 PROCEDURE — 36415 COLL VENOUS BLD VENIPUNCTURE: CPT

## 2020-11-16 PROCEDURE — 82533 TOTAL CORTISOL: CPT

## 2020-11-16 PROCEDURE — 84436 ASSAY OF TOTAL THYROXINE: CPT

## 2020-11-16 PROCEDURE — 84443 ASSAY THYROID STIM HORMONE: CPT

## 2020-11-17 ENCOUNTER — HOSPITAL ENCOUNTER (OUTPATIENT)
Facility: CLINIC | Age: 35
Setting detail: OBSERVATION
Discharge: HOME | End: 2020-11-18
Attending: EMERGENCY MEDICINE | Admitting: OBSTETRICS & GYNECOLOGY
Payer: COMMERCIAL

## 2020-11-17 ENCOUNTER — APPOINTMENT (OUTPATIENT)
Dept: ULTRASOUND IMAGING | Facility: CLINIC | Age: 35
End: 2020-11-17
Attending: EMERGENCY MEDICINE
Payer: COMMERCIAL

## 2020-11-17 DIAGNOSIS — E03.8 OTHER SPECIFIED HYPOTHYROIDISM: ICD-10-CM

## 2020-11-17 DIAGNOSIS — N88.9 CERVIX ABNORMALITY: ICD-10-CM

## 2020-11-17 DIAGNOSIS — N93.9 VAGINAL BLEEDING: ICD-10-CM

## 2020-11-17 DIAGNOSIS — O30.002 TWIN GESTATION IN SECOND TRIMESTER, UNSPECIFIED MULTIPLE GESTATION TYPE: ICD-10-CM

## 2020-11-17 DIAGNOSIS — Z33.1 PREGNANCY, INCIDENTAL: ICD-10-CM

## 2020-11-17 LAB
ALBUMIN SERPL-MCNC: 2.9 G/DL (ref 3.4–5)
ALP SERPL-CCNC: 20 U/L (ref 40–150)
ALT SERPL W P-5'-P-CCNC: 17 U/L (ref 0–50)
ANION GAP SERPL CALCULATED.3IONS-SCNC: 7 MMOL/L (ref 3–14)
AST SERPL W P-5'-P-CCNC: 18 U/L (ref 0–45)
BILIRUB SERPL-MCNC: 0.1 MG/DL (ref 0.2–1.3)
BUN SERPL-MCNC: 13 MG/DL (ref 7–30)
CALCIUM SERPL-MCNC: 8.4 MG/DL (ref 8.5–10.1)
CHLORIDE SERPL-SCNC: 107 MMOL/L (ref 94–109)
CO2 SERPL-SCNC: 23 MMOL/L (ref 20–32)
CREAT SERPL-MCNC: 0.47 MG/DL (ref 0.52–1.04)
ERYTHROCYTE [DISTWIDTH] IN BLOOD BY AUTOMATED COUNT: 12.2 % (ref 10–15)
GFR SERPL CREATININE-BSD FRML MDRD: >90 ML/MIN/{1.73_M2}
GLUCOSE SERPL-MCNC: 91 MG/DL (ref 70–99)
HCT VFR BLD AUTO: 32.7 % (ref 35–47)
HGB BLD-MCNC: 11.3 G/DL (ref 11.7–15.7)
MCH RBC QN AUTO: 33.2 PG (ref 26.5–33)
MCHC RBC AUTO-ENTMCNC: 34.6 G/DL (ref 31.5–36.5)
MCV RBC AUTO: 96 FL (ref 78–100)
PLATELET # BLD AUTO: 214 10E9/L (ref 150–450)
POTASSIUM SERPL-SCNC: 3.1 MMOL/L (ref 3.4–5.3)
PROT SERPL-MCNC: 5.9 G/DL (ref 6.8–8.8)
RBC # BLD AUTO: 3.4 10E12/L (ref 3.8–5.2)
SODIUM SERPL-SCNC: 137 MMOL/L (ref 133–144)
WBC # BLD AUTO: 7.5 10E9/L (ref 4–11)

## 2020-11-17 PROCEDURE — G0378 HOSPITAL OBSERVATION PER HR: HCPCS

## 2020-11-17 PROCEDURE — C9803 HOPD COVID-19 SPEC COLLECT: HCPCS

## 2020-11-17 PROCEDURE — 96360 HYDRATION IV INFUSION INIT: CPT

## 2020-11-17 PROCEDURE — 250N000013 HC RX MED GY IP 250 OP 250 PS 637: Performed by: OBSTETRICS & GYNECOLOGY

## 2020-11-17 PROCEDURE — G0463 HOSPITAL OUTPT CLINIC VISIT: HCPCS | Mod: 25,27

## 2020-11-17 PROCEDURE — 76810 OB US >/= 14 WKS ADDL FETUS: CPT

## 2020-11-17 PROCEDURE — 87591 N.GONORRHOEAE DNA AMP PROB: CPT | Performed by: OBSTETRICS & GYNECOLOGY

## 2020-11-17 PROCEDURE — 250N000013 HC RX MED GY IP 250 OP 250 PS 637: Performed by: EMERGENCY MEDICINE

## 2020-11-17 PROCEDURE — 80053 COMPREHEN METABOLIC PANEL: CPT | Performed by: OBSTETRICS & GYNECOLOGY

## 2020-11-17 PROCEDURE — 99285 EMERGENCY DEPT VISIT HI MDM: CPT | Mod: 25

## 2020-11-17 PROCEDURE — U0003 INFECTIOUS AGENT DETECTION BY NUCLEIC ACID (DNA OR RNA); SEVERE ACUTE RESPIRATORY SYNDROME CORONAVIRUS 2 (SARS-COV-2) (CORONAVIRUS DISEASE [COVID-19]), AMPLIFIED PROBE TECHNIQUE, MAKING USE OF HIGH THROUGHPUT TECHNOLOGIES AS DESCRIBED BY CMS-2020-01-R: HCPCS | Performed by: EMERGENCY MEDICINE

## 2020-11-17 PROCEDURE — 85027 COMPLETE CBC AUTOMATED: CPT | Performed by: OBSTETRICS & GYNECOLOGY

## 2020-11-17 PROCEDURE — 258N000003 HC RX IP 258 OP 636: Performed by: OBSTETRICS & GYNECOLOGY

## 2020-11-17 RX ORDER — LEVOTHYROXINE SODIUM 150 UG/1
TABLET ORAL
Qty: 96 TABLET | Refills: 3 | Status: SHIPPED | OUTPATIENT
Start: 2020-11-17 | End: 2020-11-17

## 2020-11-17 RX ORDER — ASPIRIN 81 MG/1
81 TABLET ORAL DAILY
COMMUNITY
End: 2024-09-10

## 2020-11-17 RX ORDER — ACETAMINOPHEN 325 MG/1
975 TABLET ORAL EVERY 6 HOURS PRN
Status: DISCONTINUED | OUTPATIENT
Start: 2020-11-17 | End: 2020-11-18 | Stop reason: HOSPADM

## 2020-11-17 RX ORDER — LEVOTHYROXINE SODIUM 150 UG/1
225 TABLET ORAL SEE ADMIN INSTRUCTIONS
COMMUNITY
End: 2020-12-18 | Stop reason: DRUGHIGH

## 2020-11-17 RX ORDER — LEVOTHYROXINE SODIUM 150 UG/1
150 TABLET ORAL SEE ADMIN INSTRUCTIONS
COMMUNITY
End: 2020-12-18

## 2020-11-17 RX ORDER — ACETAMINOPHEN 325 MG/1
650 TABLET ORAL ONCE
Status: COMPLETED | OUTPATIENT
Start: 2020-11-17 | End: 2020-11-17

## 2020-11-17 RX ORDER — FERROUS SULFATE 325(65) MG
325 TABLET ORAL WEEKLY
COMMUNITY
End: 2024-09-10

## 2020-11-17 RX ORDER — CLINDAMYCIN HCL 300 MG
300 CAPSULE ORAL 2 TIMES DAILY
Status: DISCONTINUED | OUTPATIENT
Start: 2020-11-17 | End: 2020-11-18 | Stop reason: HOSPADM

## 2020-11-17 RX ADMIN — ACETAMINOPHEN 975 MG: 325 TABLET, FILM COATED ORAL at 23:12

## 2020-11-17 RX ADMIN — ACETAMINOPHEN 650 MG: 325 TABLET, FILM COATED ORAL at 20:08

## 2020-11-17 RX ADMIN — SODIUM CHLORIDE, POTASSIUM CHLORIDE, SODIUM LACTATE AND CALCIUM CHLORIDE 1000 ML: 600; 310; 30; 20 INJECTION, SOLUTION INTRAVENOUS at 22:52

## 2020-11-17 RX ADMIN — CLINDAMYCIN HYDROCHLORIDE 300 MG: 300 CAPSULE ORAL at 23:12

## 2020-11-18 ENCOUNTER — HOSPITAL ENCOUNTER (OUTPATIENT)
Dept: ULTRASOUND IMAGING | Facility: CLINIC | Age: 35
End: 2020-11-18
Attending: OBSTETRICS & GYNECOLOGY
Payer: COMMERCIAL

## 2020-11-18 ENCOUNTER — OFFICE VISIT (OUTPATIENT)
Dept: MATERNAL FETAL MEDICINE | Facility: CLINIC | Age: 35
End: 2020-11-18
Attending: OBSTETRICS & GYNECOLOGY
Payer: COMMERCIAL

## 2020-11-18 ENCOUNTER — TRANSCRIBE ORDERS (OUTPATIENT)
Dept: MATERNAL FETAL MEDICINE | Facility: CLINIC | Age: 35
End: 2020-11-18

## 2020-11-18 ENCOUNTER — HOSPITAL ENCOUNTER (OUTPATIENT)
Facility: CLINIC | Age: 35
Setting detail: OBSERVATION
End: 2020-11-18
Admitting: OBSTETRICS & GYNECOLOGY
Payer: COMMERCIAL

## 2020-11-18 VITALS
TEMPERATURE: 99.6 F | DIASTOLIC BLOOD PRESSURE: 70 MMHG | SYSTOLIC BLOOD PRESSURE: 114 MMHG | WEIGHT: 120 LBS | BODY MASS INDEX: 20.6 KG/M2 | HEART RATE: 76 BPM | RESPIRATION RATE: 16 BRPM | OXYGEN SATURATION: 100 %

## 2020-11-18 DIAGNOSIS — O46.92 VAGINAL BLEEDING IN PREGNANCY, SECOND TRIMESTER: Primary | ICD-10-CM

## 2020-11-18 DIAGNOSIS — O30.032 MONOCHORIONIC DIAMNIOTIC TWIN GESTATION IN SECOND TRIMESTER: ICD-10-CM

## 2020-11-18 DIAGNOSIS — O26.90 PREGNANCY RELATED CONDITION, ANTEPARTUM: Primary | ICD-10-CM

## 2020-11-18 DIAGNOSIS — O26.90 PREGNANCY RELATED CONDITION, ANTEPARTUM: ICD-10-CM

## 2020-11-18 LAB
LABORATORY COMMENT REPORT: NORMAL
SARS-COV-2 RNA SPEC QL NAA+PROBE: NEGATIVE
SARS-COV-2 RNA SPEC QL NAA+PROBE: NORMAL
SPECIMEN SOURCE: NORMAL
SPECIMEN SOURCE: NORMAL

## 2020-11-18 PROCEDURE — G0378 HOSPITAL OBSERVATION PER HR: HCPCS

## 2020-11-18 PROCEDURE — G0463 HOSPITAL OUTPT CLINIC VISIT: HCPCS

## 2020-11-18 PROCEDURE — 76802 OB US < 14 WKS ADDL FETUS: CPT | Mod: 26 | Performed by: OBSTETRICS & GYNECOLOGY

## 2020-11-18 PROCEDURE — 76817 TRANSVAGINAL US OBSTETRIC: CPT

## 2020-11-18 PROCEDURE — 76817 TRANSVAGINAL US OBSTETRIC: CPT | Mod: 26 | Performed by: OBSTETRICS & GYNECOLOGY

## 2020-11-18 PROCEDURE — 250N000013 HC RX MED GY IP 250 OP 250 PS 637: Performed by: OBSTETRICS & GYNECOLOGY

## 2020-11-18 PROCEDURE — 99203 OFFICE O/P NEW LOW 30 MIN: CPT | Mod: 25 | Performed by: OBSTETRICS & GYNECOLOGY

## 2020-11-18 PROCEDURE — 76801 OB US < 14 WKS SINGLE FETUS: CPT | Mod: 26 | Performed by: OBSTETRICS & GYNECOLOGY

## 2020-11-18 RX ORDER — CLINDAMYCIN HCL 300 MG
300 CAPSULE ORAL 2 TIMES DAILY
Qty: 14 CAPSULE | Refills: 0 | Status: SHIPPED | OUTPATIENT
Start: 2020-11-18 | End: 2020-11-25

## 2020-11-18 RX ORDER — ZOLPIDEM TARTRATE 5 MG/1
5 TABLET ORAL
Status: DISCONTINUED | OUTPATIENT
Start: 2020-11-18 | End: 2020-11-18 | Stop reason: HOSPADM

## 2020-11-18 RX ADMIN — CLINDAMYCIN HYDROCHLORIDE 300 MG: 300 CAPSULE ORAL at 09:46

## 2020-11-18 RX ADMIN — ACETAMINOPHEN 975 MG: 325 TABLET, FILM COATED ORAL at 08:12

## 2020-11-18 RX ADMIN — ZOLPIDEM TARTRATE 5 MG: 5 TABLET ORAL at 00:07

## 2020-11-18 NOTE — ED TRIAGE NOTES
"15 weeks gestation with twins; vaginal bleeding at 1800 denies clots and/or tissue.  \"abdominal discomfort\" increasing throughout the day.  Seen at clinic today for what she thought was an UTI and physician started antibiotic treatment for bacterial vaginosis today.  A0.  A+ blood type.  H/o cushing's.  Care at [facility].  "

## 2020-11-18 NOTE — PROVIDER NOTIFICATION
11/17/20 2221   Provider Notification   Provider Name/Title Jimmie Escoto   Method of Notification Phone   Request Evaluate - Remote   Notification Reason SVE;Bleeding   Dr. Samuel will admit for observation on labor and delivery floor.

## 2020-11-18 NOTE — PLAN OF CARE
Vss, Pt denies feeling ctx. Pt noted small amount of blood.  Pt teary states she is worried.  Tylenol given for H/A.  Awaiting visit from OB.  Will continue to monitor and assess.

## 2020-11-18 NOTE — DISCHARGE SUMMARY
OB Antepartum Note - Hospital Day 1    S:  Patient is doing well but emotional. No contractions.  Light brown vaginal bleeding.  No LOF.    Tolerating diet.      O:  /70   Pulse 76   Temp 99.6  F (37.6  C) (Temporal)   Resp 16   Wt 54.4 kg (120 lb)   SpO2 100%   Breastfeeding No   BMI 20.60 kg/m    No intake or output data in the 24 hours ending 20 0931  Gen-A&O, NAD  Abd- Gravid, non-tender  EFM-  FHT present x2  [place]- ctx q0 minutes      Meds:    clindamycin  300 mg Oral BID         Labs:   Results for orders placed or performed during the hospital encounter of 20 (from the past 48 hour(s))   US OB >14 Weeks Multiple    Narrative    US OB >14 WEEKS MULTIPLE 2020 8:48 PM    HISTORY: VB with suprapubic contractions, approximately 15 weeks  gestational age with twins.    FINDINGS: Twin pregnancy.    First fetus:  Fetal presentation: Variable.  Placental location: Anterior, not previa.  Number of cord vessels: 3.  Fetal heart motion: Present at 147 beats per minute.  Fetal motion: Present.  Amniotic fluid volume: Normal.    Second fetus:    Fetal presentation: Variable.  Placental location: Anterior, not previa.  Number of cord vessels: 3.  Fetal heart motion: Present at 147 beats per minute.  Fetal motion: Present.  Amniotic fluid volume: Normal.    Cervix: 2.8 cm, with mild funneling. Fluid within the vagina, likely  represents blood related to patient's vaginal bleeding.    Ultrasound age by prior dating: 15 weeks and 4 days.  Ultrasound EDC by prior datin2020.        Impression    IMPRESSION:   1. There are two living intrauterine fetuses, in variable  presentation.  2. Relatively short cervix measuring 2.8 cm, with mild funneling.    DOUGLAS MOSER MD   Asymptomatic COVID-19 Virus (Coronavirus) by PCR    Specimen: Nasopharyngeal   Result Value Ref Range    COVID-19 Virus PCR to [facility] - Source Nasopharyngeal     COVID-19 Virus PCR to [facility] - Result       Test  received-See reflex to IDDL test SARS CoV2 (COVID-19) Virus RT-PCR   SARS-CoV-2 COVID-19 Virus (Coronavirus) RT-PCR Nasopharyngeal    Specimen: Nasopharyngeal   Result Value Ref Range    SARS-CoV-2 Virus Specimen Source Nasopharyngeal     SARS-CoV-2 PCR Result NEGATIVE     SARS-CoV-2 PCR Comment       Testing was performed using the Xpert Xpress SARS-CoV-2 Assay on the Cepheid Gene-Xpert   Instrument Systems. Additional information about this Emergency Use Authorization (EUA)   assay can be found via the Lab Guide.     CBC with platelets   Result Value Ref Range    WBC 7.5 4.0 - 11.0 10e9/L    RBC Count 3.40 (L) 3.8 - 5.2 10e12/L    Hemoglobin 11.3 (L) 11.7 - 15.7 g/dL    Hematocrit 32.7 (L) 35.0 - 47.0 %    MCV 96 78 - 100 fl    MCH 33.2 (H) 26.5 - 33.0 pg    MCHC 34.6 31.5 - 36.5 g/dL    RDW 12.2 10.0 - 15.0 %    Platelet Count 214 150 - 450 10e9/L   Comprehensive metabolic panel   Result Value Ref Range    Sodium 137 133 - 144 mmol/L    Potassium 3.1 (L) 3.4 - 5.3 mmol/L    Chloride 107 94 - 109 mmol/L    Carbon Dioxide 23 20 - 32 mmol/L    Anion Gap 7 3 - 14 mmol/L    Glucose 91 70 - 99 mg/dL    Urea Nitrogen 13 7 - 30 mg/dL    Creatinine 0.47 (L) 0.52 - 1.04 mg/dL    GFR Estimate >90 >60 mL/min/[1.73_m2]    GFR Estimate If Black >90 >60 mL/min/[1.73_m2]    Calcium 8.4 (L) 8.5 - 10.1 mg/dL    Bilirubin Total 0.1 (L) 0.2 - 1.3 mg/dL    Albumin 2.9 (L) 3.4 - 5.0 g/dL    Protein Total 5.9 (L) 6.8 - 8.8 g/dL    Alkaline Phosphatase 20 (L) 40 - 150 U/L    ALT 17 0 - 50 U/L    AST 18 0 - 45 U/L       A/P: 35 year old  IVF pregnancy twin gestation Mono/diamniotic @ 15w5d HD # 1 admitted with vaginal bleeding last night now minimal.    1.  OK to discharge now  2.  MFM consult today. Order placed and discussed with MFM clinic.  3. The plan of care was discussed with the patient.  She expressed understanding and agreement.    Kathrin Cassidy MD  2020

## 2020-11-18 NOTE — DISCHARGE INSTRUCTIONS
Discharge Instruction for Undelivered Patients      You were seen for: Bleeding Assessment  We Consulted: MFM  You had (Test or Medicine):ultrasound     Diet:   Drink 8 to 12 glasses of liquids (milk, juice, water) every day.  You may eat meals and snacks.     Activity:  Rest the pelvic area. No sex. Do not stimulate breasts or nipples.     Call your provider if you notice:  Swelling in your face or increased swelling in your hands or legs.  Headaches that are not relieved by Tylenol (acetaminophen).  Changes in your vision (blurring: seeing spots or stars.)  Nausea (sick to your stomach) and vomiting (throwing up).   Heartburn that doesn't go away.  Signs of bladder infection: pain when you urinate (use the toilet), need to go more often and more urgently.  The bag of amato (rupture of membranes) breaks, or you notice leaking in your underwear.  Bright red blood in your underwear.  Abdominal (lower belly) or stomach pain.  *If less than 34 weeks: Contractions (tightenings) more than 6 times in one hour.  Increase or change in vaginal discharge (note the color and amount)      Follow-up:  With MFM this afternoon.        The cause of your bleeding is unconfirmed, though your ultrasound currently shows two live twins and a slightly shortened cervix.  It is essential that you contact your OB team tomorrow to arrange very close follow up.  In the meantime, you need pelvic rest, meaning do not put anything at all in your vagina.  No new medications are needed.

## 2020-11-18 NOTE — ED PROVIDER NOTES
History   Chief Complaint:  Vaginal Bleeding    HPI  Dean Weathers is a A0 35 year old female, who is currently 15 weeks pregnant with twins, with a history of Cushing disease, Raynaud's syndrome, pituitary microadenoma, S/P pituitary resection, and hypothyroidism, who presents to the ED for evaluation of vaginal bleeding. The patient reports she began to have vaginal bleeding around 1800 today, along with some pressure in her lower abdomen. She denies visualizing any clots or tissue in the blood. She cannot quantify how much bleeding is occurring, but it has been controlled with a pad.  Earlier today, the patient had a visit at [facility] with Dr. Fallon for a possible UTI because she had increased slight vaginal itching. She received the results later in the evening and found out it was bacterial vaginosis, so she was prescribed antibiotics, but has not taken any yet. She reports having no bleeding at the time of the appointment, but had some discomfort throughout the day. She denies having shortness of breath, cough, fever, vomiting, or nausea.  Per patient report, her blood type is A+.    Allergies:  Metronidazole    Medications:    Levothyroxine  Pamelor  Decadron    Past Medical History:    Chronic migraines  Chronic paroxysmal hemicrania  Pituitary microadenoma  Cushing's syndrome  Raynaud's disease  Hypothyroidism  Secondary amenorrhea  Oligomenorrhea    Chronic constipation    Past Surgical History:    Transphenoidal pituitary resection x2  [place] teeth extraction    Family History:    Migraines  Breast cancer  MI  CAD  Rheumatoid arthritis  Ulcerative arthritis  Hyperlipidemia  Stroke    Social History:  Marital Status:   Smoking status: No  Alcohol use: No  Drug use: No  PCP: Chrissy Lopez     Review of Systems   All other systems reviewed and are negative.    Physical Exam     Patient Vitals for the past 24 hrs:   BP Temp Temp src Pulse Resp SpO2 Weight   20 2103  105/72 -- -- 72 -- 100 % --   11/17/20 1956 117/74 97.9  F (36.6  C) Oral 83 18 100 % 54.4 kg (120 lb)       Physical Exam  General: Nontoxic-appearing woman sitting upright in room 1  HENT: mucous membranes moist  CV: rate as above, regular rhythm  Resp: normal effort, speaks in full phrases, no stridor, no cough observed  GI: abdomen soft and nontender, no guarding, no palpable masses or uterine fundus  : deferred; bimanual exam performed by OB RN  MSK: no bony tenderness, no CVAT  Skin: appropriately warm and dry  Neuro: alert, clear speech, oriented  Psych: cooperative, anxious    Emergency Department Course   Imaging:  Radiology findings were communicated with the patient who voiced understanding of the findings.    US OB >14 Weeks with transvaginal:  1. There are two living intrauterine fetuses, in variable   presentation.   2. Relatively short cervix measuring 2.8 cm, with mild funneling.    Imaging independently reviewed and agree with radiologist interpretation.     Interventions:  2008: Tylenol 650 mg PO    Emergency Department Course:  Past medical records, nursing notes, and vitals reviewed.    1948 I performed an exam of the patient as documented above.     The patient was sent for a OB US while in the emergency department, results above.     2109 I rechecked the patient and discussed the results of her workup thus far.     2118 I spoke to Dr. Samuel on-call for OB-GYN.     2127 I was notified that the OB-GYN team would come down to the ED and monitor the patient from here, as this was the preferred location.    The patient was signed out to Dr. Farrar, oncoming ED physician, pending OB-GYN consult.    I personally reviewed the imaging results with the Patient and answered all related questions prior to sign out.     Impression & Plan    Medical Decision Making:   She presents with a threatened miscarriage early in the second trimester of her twin gestation.  No signs of hemodynamically  significant blood loss at this time.  Ultrasound shows 2 live twins, with a slightly shortened cervix.  Spoke with the on-call obstetrician, who recommended fetal monitoring, which was performed in the emergency department by the OB nurse and this does reveal uterine contractions.  For this reason, the on-call obstetrician Dr. Samuel will admit the patient to the hospital overnight for continued monitoring.  Covid swab will be sent per protocol though she does not have symptoms suggestive of COVID infection.      Diagnosis:     ICD-10-CM    1. Vaginal bleeding  N93.9    2. Twin gestation in second trimester, unspecified multiple gestation type  O30.002    3. Cervix abnormality  N88.9        Disposition:   Admit to Dr. Samuel, OB    Scribe Disclosure:  I, Delmy Rao, am serving as a scribe on 11/17/2020 at 7:48 PM to personally document services performed by Oswald Manley MD, based on my observations and the provider's statements to me.      Scribe Disclosure:  I, Jordi Portillo, am serving as a scribe at 9:48 PM on 11/17/2020 to document services personally performed by Oswald Manley MD based on my observations and the provider's statements to me.    This note was completed in part using Dragon voice recognition software. Although reviewed after completion, some word and grammatical errors may occur.       Oswald Manley MD  11/19/20 5060

## 2020-11-18 NOTE — H&P
"Dean Diego  [id number]  OB Admit History & Physical      HPI:  Ms. Diego  is a 35 year old  @ 15w4d by IVF dating who presented to L&D for  Vaginal bleeding.  Patient was seen in the clinic today with some vaginal irritation, lower pelvic \"achiness\"  She had an exam and was diagnosed with BV.  Rx was sent but she has not yet started.  She then called after hours stating she had a small amount of red bleeding.  Denied ctx but still had achiness/pressure feeling in pelvis.  She monitored initially but when bleeding continued, she presented to ED.    In ED, she had U/S which showed  Results for orders placed or performed during the hospital encounter of 20   US OB >14 Weeks Multiple    Impression    IMPRESSION:   1. There are two living intrauterine fetuses, in variable  presentation.  2. Relatively short cervix measuring 2.8 cm, with mild funneling.    DOUGLAS MOSER MD   Placenta anterior, no previa     Prenatal course:  1st visit at 10 weeks, regular care    Pregnancy complications:    1. Mono-Di twins - IVF pregnancy, single embryo transfer  2. Hypothyroid  3. H/O  delivery at 36 weeks due to PTL    Prenatal labs:  A+, antibody screen negative,  Rubella  Immune, Hep B/HIV/RPR all negative, GC/CT negative; genetic screening tests MT21 neg     OB history:   OB History    Para Term  AB Living   2 1 0 1 0 1   SAB TAB Ectopic Multiple Live Births   0 0 0 0 1      # Outcome Date GA Lbr Roberto/2nd Weight Sex Delivery Anes PTL Lv   2 Current            1  18 36w3d 12:28 / 00:44 2.495 kg (5 lb 8 oz) F Vag-Spont EPI  NICOLA      Name: MC DIEGO      Apgar1: 7  Apgar5: 8         PMHx:     Past Medical History:   Diagnosis Date     Central hypothyroidism     diagnosis at [place] 8/10     Cushing disease (H)     [facility] of previous reproductive problem     IVF pregnancy     Raynaud's syndrome      Unspecified endocrine disorder     " Pituitary Tumor,Microadenoma       PSHX:    Past Surgical History:   Procedure Laterality Date     PITUITARY SURGERY  3/04 and 9/05    for cushing disease     wisdom teeth       ZZC NONSPECIFIC PROCEDURE  3/04, 9/05    Mass on pituitary gland. 1/2 of gland removed       Meds:    Current Outpatient Medications   Medication Sig Dispense Refill     aspirin 81 MG EC tablet Take 81 mg by mouth daily       levothyroxine (SYNTHROID/LEVOTHROID) 150 MCG tablet Take 150 mcg by mouth See Admin Instructions Daily Monday through Friday       levothyroxine (SYNTHROID/LEVOTHROID) 150 MCG tablet Take 225 mcg by mouth See Admin Instructions Daily on Saturday and Sunday       Prenatal Vit-Fe Fumarate-FA (PRENATAL MULTIVITAMIN  PLUS IRON) 27-1 MG TABS Take by mouth daily         Allergies: Metronidazole      REVIEW OF SYSTEMS:  Positives and negatives in HPI.     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   Social Needs     Financial resource strain: Not on file     Food insecurity     Worry: Not on file     Inability: Not on file     Transportation needs     Medical: Not on file     Non-medical: Not on file   Tobacco Use     Smoking status: Never Smoker     Smokeless tobacco: Never Used   Substance and Sexual Activity     Alcohol use: No     Drug use: No     Sexual activity: Yes     Partners: Male     Birth control/protection: Condom   Lifestyle     Physical activity     Days per week: Not on file     Minutes per session: Not on file     Stress: Not on file   Relationships     Social connections     Talks on phone: Not on file     Gets together: Not on file     Attends Mosque service: Not on file     Active member of club or organization: Not  on file     Attends meetings of clubs or organizations: Not on file     Relationship status: Not on file     Intimate partner violence     Fear of current or ex partner: Not on file     Emotionally abused: Not on file     Physically abused: Not on file     Forced sexual activity: Not on file   Other Topics Concern     Parent/sibling w/ CABG, MI or angioplasty before 65F 55M? Yes   Social History Narrative    ** Merged History Encounter **             Fam Hx:    Family History   Problem Relation Age of Onset     Family History Negative Mother      Ulcerative Colitis Mother      Rheumatoid Arthritis Mother      Heart Disease Father         MI age 37     Coronary Artery Disease Father         MI age 37     Breast Cancer Maternal Grandmother          age 48     Heart Disease Maternal Grandfather         MI     Heart Disease Paternal Grandfather         MI     Family History Negative Sister          PHYSICAL EXAM:      Vitals:  BP 98/69   Pulse 67   Temp 97.9  F (36.6  C) (Oral)   Resp 18   Wt 54.4 kg (120 lb)   SpO2 100%   Breastfeeding No   BMI 20.60 kg/m    Alert Awake in NAD  ABD gravid  Cervix:  Closed, firm, posterior, high per RN.  Gush of blood from vagina with check.  Vagina tender.  EFM:  + doptones  [place]: contractions on toco and palpated    Assessment:  36 yo  at 15+4 weeks with mono-di twins, vaginal bleeding, short cervix at 2.8 cm, some ctx.    Plan:  Will admit to observation overnight given bleeding with twin gestation.   Plan assessment with CBC, CMP, GC/Chlam.  Treat BV with clindamycin (flagyl allergy).  IV fluid bolus followed by maintenance fluids.  Reassess in AM and consider MFM consult.  Covid test ordered in ED pending.    Cori Samuel MD MD  Dept of OB/GYN  2020

## 2020-11-18 NOTE — PLAN OF CARE
Pt had a good night, per pt no complaints of pain/contractions since RN ARIAS in ED.  Minimal smears of blood on pads throughout the night, light amount on tissue when wiping.  Baby A and B audible per x2 RNs, WNL, ~145 and ~155 HR.  Pt complaining of HA this am, states she gets them often during this pregnancy, ice pack provided and declining tylenol.      Arun David RN

## 2020-11-18 NOTE — PLAN OF CARE
2130: Called to ER to monitor for uterine contractions.  2141: External toco monitor applied. Discussed with patient c/o pain, contractions and diagnosis of BV today. Patient has not taken meds for BV as she has not gotten to the pharmacy yet.  2200:Contractions tracing on monitor, irregularly. Contractions are localized to right side, mid to lower uterus. Able to palpate the contractions. The rest of the uterus remains soft. Sonali pad is dry, without blood.  2210:Dr. Samuel updated via telephone regarding uterine contractions. Dr. Samuel would like RN to do SVE.  2220:SVE 0/50-60/ posterior, firm. Patient extremely uncomfortable with SVE, vagina very sore and sensitive. Gush of bright red blood noted upon entering vagina for SVE.  2225: Dr. Samuel updated via telephone regarding SVE and bleeding. Dr. Samuel will plan to admit for observation on labor and delivery floor.  2232: Patient updated with plan of care. All questions answered. External toco removed.

## 2020-11-18 NOTE — PHARMACY-ADMISSION MEDICATION HISTORY
Pharmacy Medication History  Admission medication history interview status for the 11/17/2020  admission is complete. See EPIC admission navigator for prior to admission medications       Medication history sources: Patient and Surescripts  Location of interview: Phone  Medication history source reliability: Good  Adherence assessment: Good    Significant changes made to the medication list:  Added Prenatal Vitamin, Aspirin and Iron supplement    Medication reconciliation completed by provider prior to medication history? No    Time spent in this activity: 10 minutes      Prior to Admission medications    Medication Sig Last Dose Taking? Auth Provider   aspirin 81 MG EC tablet Take 81 mg by mouth daily 11/17/2020 at Unknown time Yes Unknown, Entered By History   ferrous sulfate (FEROSUL) 325 (65 Fe) MG tablet Take 325 mg by mouth once a week 11/16/2020 at Unknown time Yes Unknown, Entered By History   levothyroxine (SYNTHROID/LEVOTHROID) 150 MCG tablet Take 150 mcg by mouth See Admin Instructions Daily Monday through Friday 11/17/2020 at Unknown time Yes Unknown, Entered By History   levothyroxine (SYNTHROID/LEVOTHROID) 150 MCG tablet Take 225 mcg by mouth See Admin Instructions Daily on Saturday and Sunday new dose today Yes Unknown, Entered By History   Prenatal Vit-Fe Fumarate-FA (PRENATAL MULTIVITAMIN  PLUS IRON) 27-1 MG TABS Take by mouth daily 11/17/2020 at Unknown time Yes Unknown, Entered By History

## 2020-11-18 NOTE — PLAN OF CARE
Vss, afebrile.  Doptone x2 FHT.  Pt denies bleeding.  Discharge antibiotics given.  Pt seen by Dr Alvarez.  Discharge orders given with follow ultrasound with MFM this afternoon.  Pt verbalized understanding of discharge orders and discharge plan.  Patient taken to car in wheelchair.

## 2020-11-18 NOTE — ED NOTES
RN called L&D triage about TOCO monitoring for 30 minutes to assess contraction status. RN will be down.

## 2020-11-18 NOTE — PROVIDER NOTIFICATION
11/17/20 6928   Provider Notification   Provider Name/Title Dr. Samuel   Method of Notification Phone   Request Evaluate - Remote   Notification Reason Uterine Activity;Pain;Bleeding   Dr. Samuel would like RN to do SVE.

## 2020-11-19 ENCOUNTER — DOCUMENTATION ONLY (OUTPATIENT)
Dept: MATERNAL FETAL MEDICINE | Facility: CLINIC | Age: 35
End: 2020-11-19

## 2020-11-19 LAB
N GONORRHOEA DNA SPEC QL NAA+PROBE: NEGATIVE
SPECIMEN SOURCE: NORMAL

## 2020-11-19 NOTE — PROGRESS NOTES
Patient called M after being seen yesterday for vaginal bleeding with questions about her current status. She states she is still having some bleeding and is wondering if this is normal. She was encouraged to call her primary OB as they would be the one to manage her current pregnancy symptoms. She is currently scheduled for another [facility] US on Monday, 11/23/20. She was informed to keep this appointment but notify her primary of her concerns. Verbalized understanding.    Mercedes Clements RN

## 2020-11-19 NOTE — PROGRESS NOTES
The patient was seen for an ultrasound in the Maternal-Fetal Medicine Center at the [facility] today.  For a detailed report of the ultrasound examination, please see the ultrasound report which can be found under the imaging tab.    Sary Coburn MD  , OB/GYN  Maternal-Fetal Medicine  [phone] (Pager)

## 2020-11-23 ENCOUNTER — OFFICE VISIT (OUTPATIENT)
Dept: MATERNAL FETAL MEDICINE | Facility: CLINIC | Age: 35
End: 2020-11-23
Attending: OBSTETRICS & GYNECOLOGY
Payer: COMMERCIAL

## 2020-11-23 ENCOUNTER — HOSPITAL ENCOUNTER (OUTPATIENT)
Dept: ULTRASOUND IMAGING | Facility: CLINIC | Age: 35
End: 2020-11-23
Attending: OBSTETRICS & GYNECOLOGY
Payer: COMMERCIAL

## 2020-11-23 DIAGNOSIS — O46.92 VAGINAL BLEEDING IN PREGNANCY, SECOND TRIMESTER: ICD-10-CM

## 2020-11-23 DIAGNOSIS — O30.032 MONOCHORIONIC DIAMNIOTIC TWIN GESTATION IN SECOND TRIMESTER: Primary | ICD-10-CM

## 2020-11-23 DIAGNOSIS — O26.90 PREGNANCY RELATED CONDITION, ANTEPARTUM: ICD-10-CM

## 2020-11-23 DIAGNOSIS — O09.819 PREGNANCY RESULTING FROM IN VITRO FERTILIZATION, ANTEPARTUM: ICD-10-CM

## 2020-11-23 PROCEDURE — 76820 UMBILICAL ARTERY ECHO: CPT | Mod: 26 | Performed by: OBSTETRICS & GYNECOLOGY

## 2020-11-23 PROCEDURE — 76805 OB US >/= 14 WKS SNGL FETUS: CPT

## 2020-11-23 PROCEDURE — 76820 UMBILICAL ARTERY ECHO: CPT

## 2020-11-23 PROCEDURE — 76810 OB US >/= 14 WKS ADDL FETUS: CPT | Mod: 26 | Performed by: OBSTETRICS & GYNECOLOGY

## 2020-11-23 PROCEDURE — 76820 UMBILICAL ARTERY ECHO: CPT | Mod: 59 | Performed by: OBSTETRICS & GYNECOLOGY

## 2020-11-23 PROCEDURE — 76805 OB US >/= 14 WKS SNGL FETUS: CPT | Mod: 26 | Performed by: OBSTETRICS & GYNECOLOGY

## 2020-11-23 NOTE — PROGRESS NOTES
"Please see \"imaging\" tab under \"Chart Review\" for details of today's US at the [facility].    Bayron Caputo MD  Maternal-Fetal Medicine    "

## 2020-12-07 ENCOUNTER — HOSPITAL ENCOUNTER (OUTPATIENT)
Dept: ULTRASOUND IMAGING | Facility: CLINIC | Age: 35
End: 2020-12-07
Attending: OBSTETRICS & GYNECOLOGY
Payer: COMMERCIAL

## 2020-12-07 ENCOUNTER — OFFICE VISIT (OUTPATIENT)
Dept: MATERNAL FETAL MEDICINE | Facility: CLINIC | Age: 35
End: 2020-12-07
Attending: OBSTETRICS & GYNECOLOGY
Payer: COMMERCIAL

## 2020-12-07 DIAGNOSIS — O30.032 MONOCHORIONIC DIAMNIOTIC TWIN GESTATION IN SECOND TRIMESTER: Primary | ICD-10-CM

## 2020-12-07 DIAGNOSIS — O09.819 PREGNANCY RESULTING FROM IN VITRO FERTILIZATION, ANTEPARTUM: ICD-10-CM

## 2020-12-07 DIAGNOSIS — O46.92 VAGINAL BLEEDING IN PREGNANCY, SECOND TRIMESTER: ICD-10-CM

## 2020-12-07 DIAGNOSIS — O30.032 MONOCHORIONIC DIAMNIOTIC TWIN GESTATION IN SECOND TRIMESTER: ICD-10-CM

## 2020-12-07 PROCEDURE — 76816 OB US FOLLOW-UP PER FETUS: CPT | Mod: 26 | Performed by: OBSTETRICS & GYNECOLOGY

## 2020-12-07 PROCEDURE — 76820 UMBILICAL ARTERY ECHO: CPT | Mod: 26 | Performed by: OBSTETRICS & GYNECOLOGY

## 2020-12-07 PROCEDURE — 76817 TRANSVAGINAL US OBSTETRIC: CPT

## 2020-12-07 PROCEDURE — 76820 UMBILICAL ARTERY ECHO: CPT | Performed by: OBSTETRICS & GYNECOLOGY

## 2020-12-07 NOTE — PROGRESS NOTES
Please see full imaging report from [employer] program under imaging tab.    Belén Baker MD  Maternal Fetal Medicine

## 2020-12-14 DIAGNOSIS — E03.8 OTHER SPECIFIED HYPOTHYROIDISM: ICD-10-CM

## 2020-12-14 DIAGNOSIS — Z33.1 PREGNANCY, INCIDENTAL: ICD-10-CM

## 2020-12-14 DIAGNOSIS — Z86.39 HISTORY OF CUSHING DISEASE: ICD-10-CM

## 2020-12-14 LAB
CORTIS SERPL-MCNC: 30.2 UG/DL (ref 4–22)
T4 SERPL-MCNC: 12.9 UG/DL (ref 4.5–13.9)
TSH SERPL DL<=0.005 MIU/L-ACNC: 0.03 MU/L (ref 0.4–4)

## 2020-12-14 PROCEDURE — 84443 ASSAY THYROID STIM HORMONE: CPT

## 2020-12-14 PROCEDURE — 36415 COLL VENOUS BLD VENIPUNCTURE: CPT

## 2020-12-14 PROCEDURE — 82533 TOTAL CORTISOL: CPT

## 2020-12-14 PROCEDURE — 84436 ASSAY OF TOTAL THYROXINE: CPT

## 2020-12-15 ENCOUNTER — MYC MEDICAL ADVICE (OUTPATIENT)
Dept: ENDOCRINOLOGY | Facility: CLINIC | Age: 35
End: 2020-12-15

## 2020-12-18 DIAGNOSIS — E03.8 OTHER SPECIFIED HYPOTHYROIDISM: Primary | ICD-10-CM

## 2020-12-18 DIAGNOSIS — Z33.1 PREGNANCY, INCIDENTAL: ICD-10-CM

## 2020-12-18 RX ORDER — LEVOTHYROXINE SODIUM 150 UG/1
TABLET ORAL
Qty: 108 TABLET | Refills: 3 | Status: SHIPPED | OUTPATIENT
Start: 2020-12-18 | End: 2021-01-25 | Stop reason: DRUGHIGH

## 2020-12-21 ENCOUNTER — OFFICE VISIT (OUTPATIENT)
Dept: MATERNAL FETAL MEDICINE | Facility: CLINIC | Age: 35
End: 2020-12-21
Attending: OBSTETRICS & GYNECOLOGY
Payer: COMMERCIAL

## 2020-12-21 ENCOUNTER — HOSPITAL ENCOUNTER (OUTPATIENT)
Dept: ULTRASOUND IMAGING | Facility: CLINIC | Age: 35
End: 2020-12-21
Attending: OBSTETRICS & GYNECOLOGY
Payer: COMMERCIAL

## 2020-12-21 DIAGNOSIS — O30.032 MONOCHORIONIC DIAMNIOTIC TWIN GESTATION IN SECOND TRIMESTER: Primary | ICD-10-CM

## 2020-12-21 DIAGNOSIS — O46.92 VAGINAL BLEEDING IN PREGNANCY, SECOND TRIMESTER: ICD-10-CM

## 2020-12-21 DIAGNOSIS — O30.032 MONOCHORIONIC DIAMNIOTIC TWIN GESTATION IN SECOND TRIMESTER: ICD-10-CM

## 2020-12-21 DIAGNOSIS — O09.819 PREGNANCY RESULTING FROM IN VITRO FERTILIZATION, ANTEPARTUM: ICD-10-CM

## 2020-12-21 PROCEDURE — 76817 TRANSVAGINAL US OBSTETRIC: CPT

## 2020-12-21 PROCEDURE — 76811 OB US DETAILED SNGL FETUS: CPT | Mod: 26 | Performed by: OBSTETRICS & GYNECOLOGY

## 2020-12-21 PROCEDURE — 76812 OB US DETAILED ADDL FETUS: CPT | Mod: 26 | Performed by: OBSTETRICS & GYNECOLOGY

## 2020-12-21 PROCEDURE — 76820 UMBILICAL ARTERY ECHO: CPT | Mod: 26 | Performed by: OBSTETRICS & GYNECOLOGY

## 2020-12-21 PROCEDURE — 76820 UMBILICAL ARTERY ECHO: CPT | Mod: 59 | Performed by: OBSTETRICS & GYNECOLOGY

## 2020-12-29 ENCOUNTER — HOSPITAL ENCOUNTER (OUTPATIENT)
Dept: CARDIOLOGY | Facility: CLINIC | Age: 35
Discharge: HOME | End: 2020-12-29
Attending: OBSTETRICS & GYNECOLOGY | Admitting: OBSTETRICS & GYNECOLOGY
Payer: COMMERCIAL

## 2020-12-29 DIAGNOSIS — O30.032 MONOCHORIONIC DIAMNIOTIC TWIN GESTATION IN SECOND TRIMESTER: ICD-10-CM

## 2020-12-29 DIAGNOSIS — O09.819 PREGNANCY RESULTING FROM IN VITRO FERTILIZATION, ANTEPARTUM: ICD-10-CM

## 2020-12-29 DIAGNOSIS — O46.92 VAGINAL BLEEDING IN PREGNANCY, SECOND TRIMESTER: ICD-10-CM

## 2020-12-29 PROCEDURE — 93325 DOPPLER ECHO COLOR FLOW MAPG: CPT | Mod: 26 | Performed by: PEDIATRICS

## 2020-12-29 PROCEDURE — 93325 DOPPLER ECHO COLOR FLOW MAPG: CPT

## 2020-12-29 PROCEDURE — 76825 ECHO EXAM OF FETAL HEART: CPT | Mod: 26 | Performed by: PEDIATRICS

## 2020-12-29 PROCEDURE — 76827 ECHO EXAM OF FETAL HEART: CPT | Mod: 26 | Performed by: PEDIATRICS

## 2020-12-29 PROCEDURE — 76827 ECHO EXAM OF FETAL HEART: CPT

## 2020-12-29 NOTE — PROGRESS NOTES
Fetal Cardiology Consultation    Patient:  Dean Weathers MRN:  [id number]   YOB: 1985 Age:  35 year old   Date of Visit:  12/29/2020 PCP:  Chrissy Lopez MD   DANUTA: 5/7/2021, by Est. Date of Conception EGA: 21w4d weeks     Dear Dr. Caputo:    I had the pleasure of seeing Dean Weathers at the [facility] Fetal [facility]chocardi[facility] Laboratory in [place] on 12/29/2020 in consultation for fetal echocardiography results. She presented today by herself. As you know, she is a 35 year old female with multiple gestation (monochorionic diamniotic twins).    In both twins, the fetal echocardiogram was normal. Normal fetal cardiac anatomy. Normal right and left ventricular size and function without hypertrophy. No evidence of diastolic dysfunction. No pericardial effusion. No arrhythmia.     I reviewed and interpreted the fetal echocardiogram today. I discussed the normal results with Ms. Weathers. While these results are normal, it is important to note that fetal echocardiography cannot exclude small atrial or ventricular septal defects, persistent ductus arteriosus, mild coarctation of the aorta, partial anomalous pulmonary venous return, minor anatomic valve anomalies, or coronary artery anomalies.     Thank you for allowing me to participate in Ms. Weathers's care. Please don't hesitate to contact me or the Fetal Cardiology team at [facility] with any questions or concerns.     I spent a total of 5 minutes face-to-face with Ms. Weathers during today's office visit. Over 50% of this time was spent counseling the patient and/or coordinating care regarding the fetal echocardiography results.     Carlos Wade MD  Pediatric Cardiology  [facility]  Phone [phone]

## 2021-01-04 ENCOUNTER — OFFICE VISIT (OUTPATIENT)
Dept: MATERNAL FETAL MEDICINE | Facility: CLINIC | Age: 36
End: 2021-01-04
Attending: OBSTETRICS & GYNECOLOGY
Payer: COMMERCIAL

## 2021-01-04 ENCOUNTER — HOSPITAL ENCOUNTER (OUTPATIENT)
Dept: ULTRASOUND IMAGING | Facility: CLINIC | Age: 36
End: 2021-01-04
Attending: OBSTETRICS & GYNECOLOGY
Payer: COMMERCIAL

## 2021-01-04 DIAGNOSIS — O46.92 VAGINAL BLEEDING IN PREGNANCY, SECOND TRIMESTER: ICD-10-CM

## 2021-01-04 DIAGNOSIS — O30.032 MONOCHORIONIC DIAMNIOTIC TWIN GESTATION IN SECOND TRIMESTER: ICD-10-CM

## 2021-01-04 DIAGNOSIS — O30.032 MONOCHORIONIC DIAMNIOTIC TWIN GESTATION IN SECOND TRIMESTER: Primary | ICD-10-CM

## 2021-01-04 DIAGNOSIS — O09.819 PREGNANCY RESULTING FROM IN VITRO FERTILIZATION, ANTEPARTUM: ICD-10-CM

## 2021-01-04 PROCEDURE — 76820 UMBILICAL ARTERY ECHO: CPT | Mod: 26 | Performed by: OBSTETRICS & GYNECOLOGY

## 2021-01-04 PROCEDURE — 76821 MIDDLE CEREBRAL ARTERY ECHO: CPT | Performed by: OBSTETRICS & GYNECOLOGY

## 2021-01-04 PROCEDURE — 76821 MIDDLE CEREBRAL ARTERY ECHO: CPT | Mod: 26 | Performed by: OBSTETRICS & GYNECOLOGY

## 2021-01-04 PROCEDURE — 76816 OB US FOLLOW-UP PER FETUS: CPT | Mod: 59

## 2021-01-04 PROCEDURE — 76817 TRANSVAGINAL US OBSTETRIC: CPT | Mod: 26 | Performed by: OBSTETRICS & GYNECOLOGY

## 2021-01-04 PROCEDURE — 76816 OB US FOLLOW-UP PER FETUS: CPT | Mod: 26 | Performed by: OBSTETRICS & GYNECOLOGY

## 2021-01-04 NOTE — PROGRESS NOTES
"Please see \"Imaging\" tab under \"Chart Review\" for details of today's US.    Emani Gaines, DO    "

## 2021-01-15 ENCOUNTER — HEALTH MAINTENANCE LETTER (OUTPATIENT)
Age: 36
End: 2021-01-15

## 2021-01-18 ENCOUNTER — OFFICE VISIT (OUTPATIENT)
Dept: MATERNAL FETAL MEDICINE | Facility: CLINIC | Age: 36
End: 2021-01-18
Attending: OBSTETRICS & GYNECOLOGY
Payer: COMMERCIAL

## 2021-01-18 ENCOUNTER — HOSPITAL ENCOUNTER (OUTPATIENT)
Dept: ULTRASOUND IMAGING | Facility: CLINIC | Age: 36
End: 2021-01-18
Attending: OBSTETRICS & GYNECOLOGY
Payer: COMMERCIAL

## 2021-01-18 DIAGNOSIS — O30.032 MONOCHORIONIC DIAMNIOTIC TWIN GESTATION IN SECOND TRIMESTER: ICD-10-CM

## 2021-01-18 DIAGNOSIS — O30.032 MONOCHORIONIC DIAMNIOTIC TWIN GESTATION IN SECOND TRIMESTER: Primary | ICD-10-CM

## 2021-01-18 PROCEDURE — 76816 OB US FOLLOW-UP PER FETUS: CPT | Mod: 26 | Performed by: OBSTETRICS & GYNECOLOGY

## 2021-01-18 PROCEDURE — 76820 UMBILICAL ARTERY ECHO: CPT | Mod: 26 | Performed by: OBSTETRICS & GYNECOLOGY

## 2021-01-18 PROCEDURE — 76820 UMBILICAL ARTERY ECHO: CPT | Mod: 59 | Performed by: OBSTETRICS & GYNECOLOGY

## 2021-01-18 PROCEDURE — 76816 OB US FOLLOW-UP PER FETUS: CPT | Mod: 59

## 2021-01-18 NOTE — PROGRESS NOTES
The patient was seen for an ultrasound in the Maternal-Fetal Medicine Center at the [facility] today.  For a detailed report of the ultrasound examination, please see the ultrasound report which can be found under the imaging tab.    Sary Coburn MD  , OB/GYN  Maternal-Fetal Medicine  [phone] (Pager)

## 2021-01-25 DIAGNOSIS — Z33.1 PREGNANCY, INCIDENTAL: ICD-10-CM

## 2021-01-25 DIAGNOSIS — E03.8 OTHER SPECIFIED HYPOTHYROIDISM: Primary | ICD-10-CM

## 2021-01-25 DIAGNOSIS — Z86.39 HISTORY OF CUSHING DISEASE: ICD-10-CM

## 2021-01-25 DIAGNOSIS — E03.8 OTHER SPECIFIED HYPOTHYROIDISM: ICD-10-CM

## 2021-01-25 LAB
CORTIS SERPL-MCNC: 31.2 UG/DL (ref 4–22)
T4 SERPL-MCNC: 13.9 UG/DL (ref 4.5–13.9)
TSH SERPL DL<=0.005 MIU/L-ACNC: 0.04 MU/L (ref 0.4–4)

## 2021-01-25 PROCEDURE — 82533 TOTAL CORTISOL: CPT

## 2021-01-25 PROCEDURE — 36415 COLL VENOUS BLD VENIPUNCTURE: CPT

## 2021-01-25 PROCEDURE — 84443 ASSAY THYROID STIM HORMONE: CPT

## 2021-01-25 PROCEDURE — 84436 ASSAY OF TOTAL THYROXINE: CPT

## 2021-01-25 RX ORDER — LEVOTHYROXINE SODIUM 200 UG/1
TABLET ORAL
Qty: 96 TABLET | Refills: 4 | Status: SHIPPED | OUTPATIENT
Start: 2021-01-25 | End: 2021-04-18 | Stop reason: DRUGHIGH

## 2021-02-01 ENCOUNTER — HOSPITAL ENCOUNTER (OUTPATIENT)
Dept: ULTRASOUND IMAGING | Facility: CLINIC | Age: 36
End: 2021-02-01
Attending: OBSTETRICS & GYNECOLOGY
Payer: COMMERCIAL

## 2021-02-01 ENCOUNTER — OFFICE VISIT (OUTPATIENT)
Dept: MATERNAL FETAL MEDICINE | Facility: CLINIC | Age: 36
End: 2021-02-01
Attending: OBSTETRICS & GYNECOLOGY
Payer: COMMERCIAL

## 2021-02-01 DIAGNOSIS — O30.032 MONOCHORIONIC DIAMNIOTIC TWIN GESTATION IN SECOND TRIMESTER: Primary | ICD-10-CM

## 2021-02-01 DIAGNOSIS — O30.032 MONOCHORIONIC DIAMNIOTIC TWIN GESTATION IN SECOND TRIMESTER: ICD-10-CM

## 2021-02-01 PROCEDURE — 76820 UMBILICAL ARTERY ECHO: CPT | Mod: 59 | Performed by: OBSTETRICS & GYNECOLOGY

## 2021-02-01 PROCEDURE — 76820 UMBILICAL ARTERY ECHO: CPT | Mod: 26 | Performed by: OBSTETRICS & GYNECOLOGY

## 2021-02-01 PROCEDURE — 76816 OB US FOLLOW-UP PER FETUS: CPT | Mod: 26 | Performed by: OBSTETRICS & GYNECOLOGY

## 2021-02-01 PROCEDURE — 76821 MIDDLE CEREBRAL ARTERY ECHO: CPT | Mod: 59 | Performed by: OBSTETRICS & GYNECOLOGY

## 2021-02-01 PROCEDURE — 76816 OB US FOLLOW-UP PER FETUS: CPT | Mod: 59

## 2021-02-01 PROCEDURE — 76821 MIDDLE CEREBRAL ARTERY ECHO: CPT | Mod: 26 | Performed by: OBSTETRICS & GYNECOLOGY

## 2021-02-09 ENCOUNTER — OFFICE VISIT (OUTPATIENT)
Dept: MATERNAL FETAL MEDICINE | Facility: CLINIC | Age: 36
End: 2021-02-09
Attending: OBSTETRICS & GYNECOLOGY
Payer: COMMERCIAL

## 2021-02-09 ENCOUNTER — HOSPITAL ENCOUNTER (OUTPATIENT)
Dept: ULTRASOUND IMAGING | Facility: CLINIC | Age: 36
End: 2021-02-09
Attending: OBSTETRICS & GYNECOLOGY
Payer: COMMERCIAL

## 2021-02-09 DIAGNOSIS — O30.032 MONOCHORIONIC DIAMNIOTIC TWIN GESTATION IN SECOND TRIMESTER: Primary | ICD-10-CM

## 2021-02-09 DIAGNOSIS — O30.032 MONOCHORIONIC DIAMNIOTIC TWIN GESTATION IN SECOND TRIMESTER: ICD-10-CM

## 2021-02-09 PROCEDURE — 76820 UMBILICAL ARTERY ECHO: CPT | Mod: 26 | Performed by: OBSTETRICS & GYNECOLOGY

## 2021-02-09 PROCEDURE — 76820 UMBILICAL ARTERY ECHO: CPT | Mod: 59 | Performed by: OBSTETRICS & GYNECOLOGY

## 2021-02-09 PROCEDURE — 76816 OB US FOLLOW-UP PER FETUS: CPT | Mod: 59

## 2021-02-09 PROCEDURE — 76821 MIDDLE CEREBRAL ARTERY ECHO: CPT | Mod: 26 | Performed by: OBSTETRICS & GYNECOLOGY

## 2021-02-09 PROCEDURE — 76816 OB US FOLLOW-UP PER FETUS: CPT | Mod: 26 | Performed by: OBSTETRICS & GYNECOLOGY

## 2021-02-09 NOTE — PROGRESS NOTES
The patient was seen for an ultrasound in the Maternal-Fetal Medicine Center at the [facility] today.  For a detailed report of the ultrasound examination, please see the ultrasound report which can be found under the imaging tab.    Sary Coburn MD  , OB/GYN  Maternal-Fetal Medicine  [phone] (Pager)

## 2021-02-15 ENCOUNTER — OFFICE VISIT (OUTPATIENT)
Dept: MATERNAL FETAL MEDICINE | Facility: CLINIC | Age: 36
End: 2021-02-15
Attending: OBSTETRICS & GYNECOLOGY
Payer: COMMERCIAL

## 2021-02-15 ENCOUNTER — HOSPITAL ENCOUNTER (OUTPATIENT)
Dept: ULTRASOUND IMAGING | Facility: CLINIC | Age: 36
End: 2021-02-15
Attending: OBSTETRICS & GYNECOLOGY
Payer: COMMERCIAL

## 2021-02-15 DIAGNOSIS — O30.032 MONOCHORIONIC DIAMNIOTIC TWIN GESTATION IN SECOND TRIMESTER: Primary | ICD-10-CM

## 2021-02-15 DIAGNOSIS — O09.819 PREGNANCY RESULTING FROM IN VITRO FERTILIZATION, ANTEPARTUM: ICD-10-CM

## 2021-02-15 DIAGNOSIS — O30.032 MONOCHORIONIC DIAMNIOTIC TWIN GESTATION IN SECOND TRIMESTER: ICD-10-CM

## 2021-02-15 PROCEDURE — 76821 MIDDLE CEREBRAL ARTERY ECHO: CPT | Mod: 26 | Performed by: OBSTETRICS & GYNECOLOGY

## 2021-02-15 PROCEDURE — 76816 OB US FOLLOW-UP PER FETUS: CPT | Mod: 26 | Performed by: OBSTETRICS & GYNECOLOGY

## 2021-02-15 PROCEDURE — 76816 OB US FOLLOW-UP PER FETUS: CPT

## 2021-02-15 PROCEDURE — 76820 UMBILICAL ARTERY ECHO: CPT | Mod: 59 | Performed by: OBSTETRICS & GYNECOLOGY

## 2021-02-15 PROCEDURE — 76820 UMBILICAL ARTERY ECHO: CPT | Mod: 26 | Performed by: OBSTETRICS & GYNECOLOGY

## 2021-02-15 PROCEDURE — 76821 MIDDLE CEREBRAL ARTERY ECHO: CPT | Mod: 59 | Performed by: OBSTETRICS & GYNECOLOGY

## 2021-02-15 NOTE — PROGRESS NOTES
Please see full imaging report from [employer] program under imaging tab.    Findings reviewed with Dean today; she has had prior different MCAs for the twins but they have never exceeded the MoM cutoffs to suspect TAPS. Case reviewed with Dr. Gaines today and with the patient in person. It has been recommended that she follow up with us on Wednesday, but we will also refer her to the twin to twin transfusion syndrome program at [facility] (Dr. NORM Marquez) to see if additional intervention/treatment is recommended at this gestational age, and to see if they might be able to see her for consultation this week. Discussed with Dean that this may improve, but there is also the possibility that more frequently surveillance, betamethasone administration, or intrauterine transfusion, as well as earlier delivery or antepartum hospitalization, might be recommended.     Please note that the Doppler of the MCA as assessed one week ago were normal for both twins, but the values have been near the cutoffs for normal previously.     Belén Baker MD  Maternal Fetal Medicine

## 2021-02-15 NOTE — NURSING NOTE
Referral paperwork sent to [facility] (Dr. SHARI Marquez).  Confirmed that patient got set up for an appointment with them this coming Wednesday, Feb. 17th at 0730.  MFM will cancel currently scheduled appointment for that same day.  As of now, patient will see MFM again on Monday, Feb. 22nd. Patient aware of plan of care.

## 2021-02-17 ENCOUNTER — TRANSFERRED RECORDS (OUTPATIENT)
Dept: HEALTH INFORMATION MANAGEMENT | Facility: CLINIC | Age: 36
End: 2021-02-17

## 2021-02-17 ENCOUNTER — DOCUMENTATION ONLY (OUTPATIENT)
Dept: MATERNAL FETAL MEDICINE | Facility: CLINIC | Age: 36
End: 2021-02-17

## 2021-02-17 ENCOUNTER — HOSPITAL ENCOUNTER (OUTPATIENT)
Facility: CLINIC | Age: 36
Discharge: HOME | End: 2021-02-17
Attending: OBSTETRICS & GYNECOLOGY | Admitting: OBSTETRICS & GYNECOLOGY
Payer: COMMERCIAL

## 2021-02-17 VITALS
HEART RATE: 90 BPM | BODY MASS INDEX: 24.24 KG/M2 | RESPIRATION RATE: 16 BRPM | TEMPERATURE: 98.5 F | WEIGHT: 142 LBS | HEIGHT: 64 IN | DIASTOLIC BLOOD PRESSURE: 69 MMHG | SYSTOLIC BLOOD PRESSURE: 114 MMHG

## 2021-02-17 DIAGNOSIS — O30.032 MONOCHORIONIC DIAMNIOTIC TWIN GESTATION IN SECOND TRIMESTER: Primary | ICD-10-CM

## 2021-02-17 LAB
ALBUMIN UR-MCNC: NEGATIVE MG/DL
APPEARANCE UR: CLEAR
BILIRUB UR QL STRIP: NEGATIVE
COLOR UR AUTO: ABNORMAL
GLUCOSE UR STRIP-MCNC: NEGATIVE MG/DL
HGB UR QL STRIP: NEGATIVE
KETONES UR STRIP-MCNC: NEGATIVE MG/DL
LEUKOCYTE ESTERASE UR QL STRIP: NEGATIVE
MUCOUS THREADS #/AREA URNS LPF: PRESENT /LPF
NITRATE UR QL: NEGATIVE
PH UR STRIP: 7 PH (ref 5–7)
RBC #/AREA URNS AUTO: <1 /HPF (ref 0–2)
SOURCE: ABNORMAL
SP GR UR STRIP: 1 (ref 1–1.03)
UROBILINOGEN UR STRIP-MCNC: 0 MG/DL (ref 0–2)
WBC #/AREA URNS AUTO: <1 /HPF (ref 0–5)

## 2021-02-17 PROCEDURE — G0463 HOSPITAL OUTPT CLINIC VISIT: HCPCS | Mod: 25

## 2021-02-17 PROCEDURE — 59025 FETAL NON-STRESS TEST: CPT

## 2021-02-17 PROCEDURE — 81001 URINALYSIS AUTO W/SCOPE: CPT | Performed by: OBSTETRICS & GYNECOLOGY

## 2021-02-17 PROCEDURE — 59025 FETAL NON-STRESS TEST: CPT | Mod: 59

## 2021-02-17 RX ORDER — SWAB
1 SWAB, NON-MEDICATED MISCELLANEOUS DAILY
COMMUNITY
End: 2024-09-10

## 2021-02-17 RX ORDER — ACETAMINOPHEN 325 MG/1
325-650 TABLET ORAL EVERY 6 HOURS PRN
COMMUNITY

## 2021-02-17 ASSESSMENT — MIFFLIN-ST. JEOR: SCORE: 1334.11

## 2021-02-17 ASSESSMENT — ACTIVITIES OF DAILY LIVING (ADL)
TOILETING_ISSUES: NO
FALL_HISTORY_WITHIN_LAST_SIX_MONTHS: NO

## 2021-02-17 NOTE — DISCHARGE INSTRUCTIONS
Discharge Instruction for Undelivered Patients      You were seen for: Bleeding Assessment  We Consulted: Dr. Thomas  You had (Test or Medicine):Non-Stress Test, speculum exam, cervical exam     Diet:   Drink 8 to 12 glasses of liquids (milk, juice, water) every day.  You may eat meals and snacks.     Activity:  Count fetal kicks everyday (see handout)  Call your doctor or nurse midwife if your baby is moving less than usual.     Call your provider if you notice:  Swelling in your face or increased swelling in your hands or legs.  Headaches that are not relieved by Tylenol (acetaminophen).  Changes in your vision (blurring: seeing spots or stars.)  Nausea (sick to your stomach) and vomiting (throwing up).   Weight gain of 5 pounds or more per week.  Heartburn that doesn't go away.  Signs of bladder infection: pain when you urinate (use the toilet), need to go more often and more urgently.  The bag of amato (rupture of membranes) breaks, or you notice leaking in your underwear.  Bright red blood in your underwear.  Abdominal (lower belly) or stomach pain.  *If less than 34 weeks: Contractions more than 6 times in one hour.      Follow-up:  At [facility] OB on either Friday or Monday with an OB Doctor

## 2021-02-17 NOTE — PROGRESS NOTES
Nurse from [facility] called regarding patient who was seen on Monday 02/15 with [facility]. [facility] will see pt on Monday 02/22 and M will follow up on Thursday 02/25. Pt received first Betamethasone injection on 02/17 and needs to receive the second dose on 02/18. Pt sees Dr. Bills and Asuncion BARNEY called to follow up about second Betamethasone. [place] nurse aware and scheduling pt to receive second dose at [facility].    Dr. Belén Baker called and updated [facility] nurse about pt's situation and need for follow up. TAPS check appointments on Mondays and Thursdays along with growth every 3 weeks. Dr. Marquez at [facility] is helping manage pt and he was added to pt's treatment team where his cell phone number is available.

## 2021-02-17 NOTE — PLAN OF CARE
1348 -  at 28+5/7 weeks presents to [facility] from home for evaluation of pink spotting.  Patient denies LOF or contractions; reports good fetal movement.  POC discussed including monitoring, labs and possible SVE.  Patient verbally consents.  Monitors applied.  Admission assessment completed.  Patient denies having any spotting on her current pad, but did notice a few drops of blood in the toilet after voiding.  Urine collected and sent to lab.    1405 - Dr. Thomas paged.    1410 - Dr. Thomas at bedside for evaluation.  Speculum exam performed by MD.  No active bleeding noted by MD, however she did note white vaginal discharge.  SVE by MD showed a cervix that is FT/thick/long/firm.  Orders received to discharge patient to home undelivered with follow up in [facility] tomorrow for second dose of betamethasone at 1130.  No monitoring needed at that time per MD.    1422 - Monitors removed.  Discharge instructions given re: PTL precautions, bleeding precautions, kick counts.  Patient and  verbalize understanding and deny further questions at this time.  Patient discharged to home undelivered.

## 2021-02-18 ENCOUNTER — HOSPITAL ENCOUNTER (OUTPATIENT)
Facility: CLINIC | Age: 36
Discharge: HOME | End: 2021-02-18
Attending: OBSTETRICS & GYNECOLOGY | Admitting: OBSTETRICS & GYNECOLOGY
Payer: COMMERCIAL

## 2021-02-18 ENCOUNTER — HOSPITAL ENCOUNTER (OUTPATIENT)
Facility: CLINIC | Age: 36
End: 2021-02-18
Admitting: OBSTETRICS & GYNECOLOGY
Payer: COMMERCIAL

## 2021-02-18 PROCEDURE — 96372 THER/PROPH/DIAG INJ SC/IM: CPT | Performed by: OBSTETRICS & GYNECOLOGY

## 2021-02-18 PROCEDURE — 250N000011 HC RX IP 250 OP 636: Performed by: OBSTETRICS & GYNECOLOGY

## 2021-02-18 RX ORDER — BETAMETHASONE SODIUM PHOSPHATE AND BETAMETHASONE ACETATE 3; 3 MG/ML; MG/ML
INJECTION, SUSPENSION INTRA-ARTICULAR; INTRALESIONAL; INTRAMUSCULAR; SOFT TISSUE
Status: DISCONTINUED
Start: 2021-02-18 | End: 2021-02-18 | Stop reason: HOSPADM

## 2021-02-18 RX ORDER — ONDANSETRON 2 MG/ML
4 INJECTION INTRAMUSCULAR; INTRAVENOUS EVERY 6 HOURS PRN
Status: DISCONTINUED | OUTPATIENT
Start: 2021-02-18 | End: 2021-02-18 | Stop reason: HOSPADM

## 2021-02-18 RX ORDER — BETAMETHASONE SODIUM PHOSPHATE AND BETAMETHASONE ACETATE 3; 3 MG/ML; MG/ML
12 INJECTION, SUSPENSION INTRA-ARTICULAR; INTRALESIONAL; INTRAMUSCULAR; SOFT TISSUE EVERY 24 HOURS
Status: DISCONTINUED | OUTPATIENT
Start: 2021-02-18 | End: 2021-02-18 | Stop reason: HOSPADM

## 2021-02-18 RX ADMIN — BETAMETHASONE SODIUM PHOSPHATE AND BETAMETHASONE ACETATE 12 MG: 3; 3 INJECTION, SUSPENSION INTRA-ARTICULAR; INTRALESIONAL; INTRAMUSCULAR at 11:39

## 2021-02-18 NOTE — DISCHARGE INSTRUCTIONS
Discharge Instruction for Undelivered Patients      You were seen for: betamethasone  We Consulted: Dr Tabares  You had (Test or Medicine): second dose of Betamethasone, vital signs.    Diet:   Drink 8 to 12 glasses of liquids (milk, juice, water) every day.  You may eat meals and snacks.     Activity:  Count fetal kicks everyday (see handout)  Call your doctor or nurse midwife if your baby is moving less than usual.     Call your provider if you notice:  Swelling in your face or increased swelling in your hands or legs.  Headaches that are not relieved by Tylenol (acetaminophen).  Changes in your vision (blurring: seeing spots or stars.)  Nausea (sick to your stomach) and vomiting (throwing up).   Weight gain of 5 pounds or more per week.  Heartburn that doesn't go away.  Signs of bladder infection: pain when you urinate (use the toilet), need to go more often and more urgently.  The bag of amato (rupture of membranes) breaks, or you notice leaking in your underwear.  Bright red blood in your underwear.  Abdominal (lower belly) or stomach pain.  Second (plus) baby: Contractions (tightening) less than 10 minutes apart and getting stronger.  *If less than 34 weeks: Contractions (tightenings) more than 6 times in one hour.  Increase or change in vaginal discharge (note the color and amount)      Follow-up:  As scheduled in the clinic

## 2021-02-18 NOTE — PROGRESS NOTES
"OB Triage Note    S: 35 year old  at 28w5d with mono/di twins presenting to [facility] for evaluation of spotting. Has had increased discharge, was evaluated for BV earlier this week and was negative. Wearing a panty-liner and feeling some vulvar irritation. Denies contractions, LOF, bright red bleeding. Good FM x2.     Appointment today with TTTS center at [facility]. Recommending delivery at 32 weeks and twice weekly surveillance due to stage I TAPS. BMZ #1 given today at [facility]. Normal fluid on ultrasound today x2.    O:   Vitals:    21 1348 21 1357   BP: 114/69    Pulse: 90    Resp: 16    Temp: 98.5  F (36.9  C)    TempSrc: Temporal    Weight:  64.4 kg (142 lb)   Height:  1.626 m (5' 4\")      General: NAD  Abdomen: gravid  : speculum exam with visually closed multiparous cervix, white/pink mucous discharge noted. No LOF or bleeding.   SVE: FT/L/H    FHT A: category I, reactive  FHT B: category I, reactive  [place]: rare contraction    A/P: 35 year old  at 28w5d with mono/di twins who presented for evaluation of pink spotting. No signs of labor or ROM.    - Ok for discharge to home  - Labor warning signs  - Return to [facility] tomorrow for 2nd BMZ  - Follow-up with MFM as per plan    MD Asuncion Aviles OB/GYN  2021   "

## 2021-02-18 NOTE — PLAN OF CARE
Pt arrived on unit at 1131 for second does of betamethsone. VSS. Betamethasone administration explained to patient, received verbal consent from patient. Orders placed. Betamethasone administered. discherge instructions reviewed and signed. Pt left ambulatory at 1145.

## 2021-02-22 ENCOUNTER — TRANSFERRED RECORDS (OUTPATIENT)
Dept: HEALTH INFORMATION MANAGEMENT | Facility: CLINIC | Age: 36
End: 2021-02-22

## 2021-02-23 ENCOUNTER — TRANSFERRED RECORDS (OUTPATIENT)
Dept: HEALTH INFORMATION MANAGEMENT | Facility: CLINIC | Age: 36
End: 2021-02-23

## 2021-02-24 DIAGNOSIS — Z33.1 PREGNANCY, INCIDENTAL: ICD-10-CM

## 2021-02-24 DIAGNOSIS — E03.8 OTHER SPECIFIED HYPOTHYROIDISM: ICD-10-CM

## 2021-02-24 DIAGNOSIS — Z86.39 HISTORY OF CUSHING DISEASE: ICD-10-CM

## 2021-02-24 LAB
CORTIS SERPL-MCNC: 16.5 UG/DL (ref 4–22)
T4 SERPL-MCNC: 16.3 UG/DL (ref 4.5–13.9)
TSH SERPL DL<=0.005 MIU/L-ACNC: 0.02 MU/L (ref 0.4–4)

## 2021-02-24 PROCEDURE — 82533 TOTAL CORTISOL: CPT

## 2021-02-24 PROCEDURE — 84436 ASSAY OF TOTAL THYROXINE: CPT

## 2021-02-24 PROCEDURE — 36415 COLL VENOUS BLD VENIPUNCTURE: CPT

## 2021-02-24 PROCEDURE — 84443 ASSAY THYROID STIM HORMONE: CPT

## 2021-02-25 ENCOUNTER — OFFICE VISIT (OUTPATIENT)
Dept: MATERNAL FETAL MEDICINE | Facility: CLINIC | Age: 36
End: 2021-02-25
Attending: OBSTETRICS & GYNECOLOGY
Payer: COMMERCIAL

## 2021-02-25 ENCOUNTER — MYC MEDICAL ADVICE (OUTPATIENT)
Dept: ENDOCRINOLOGY | Facility: CLINIC | Age: 36
End: 2021-02-25

## 2021-02-25 ENCOUNTER — HOSPITAL ENCOUNTER (OUTPATIENT)
Dept: ULTRASOUND IMAGING | Facility: CLINIC | Age: 36
End: 2021-02-25
Attending: OBSTETRICS & GYNECOLOGY
Payer: COMMERCIAL

## 2021-02-25 DIAGNOSIS — O30.032 MONOCHORIONIC DIAMNIOTIC TWIN GESTATION IN SECOND TRIMESTER: ICD-10-CM

## 2021-02-25 DIAGNOSIS — O30.033 MONOCHORIONIC DIAMNIOTIC TWIN GESTATION IN THIRD TRIMESTER: Primary | ICD-10-CM

## 2021-02-25 PROCEDURE — 76821 MIDDLE CEREBRAL ARTERY ECHO: CPT | Mod: 26 | Performed by: OBSTETRICS & GYNECOLOGY

## 2021-02-25 PROCEDURE — 76816 OB US FOLLOW-UP PER FETUS: CPT | Mod: 59

## 2021-02-25 PROCEDURE — 76820 UMBILICAL ARTERY ECHO: CPT | Mod: 59 | Performed by: OBSTETRICS & GYNECOLOGY

## 2021-02-25 PROCEDURE — 76816 OB US FOLLOW-UP PER FETUS: CPT | Mod: 26 | Performed by: OBSTETRICS & GYNECOLOGY

## 2021-02-25 PROCEDURE — 76820 UMBILICAL ARTERY ECHO: CPT | Mod: 26 | Performed by: OBSTETRICS & GYNECOLOGY

## 2021-02-25 PROCEDURE — 76821 MIDDLE CEREBRAL ARTERY ECHO: CPT | Mod: 59 | Performed by: OBSTETRICS & GYNECOLOGY

## 2021-02-25 NOTE — PROGRESS NOTES
Dean Weathers was seen for an ultrasound today at the Maternal-Fetal Medicine center.      For the details of the ultrasound please see the report which can be found under the imaging tab.      Trinidad Anderson MD  , OB/GYN  Maternal-Fetal Medicine  [email or web address]  [phone] (Main M Office)  [id number] or [phone] (for 24 hour MFM questions)  [phone] (Pager)

## 2021-02-26 ENCOUNTER — TELEPHONE (OUTPATIENT)
Dept: MATERNAL FETAL MEDICINE | Facility: CLINIC | Age: 36
End: 2021-02-26

## 2021-02-26 DIAGNOSIS — E03.8 OTHER SPECIFIED HYPOTHYROIDISM: Primary | ICD-10-CM

## 2021-02-26 DIAGNOSIS — Z33.1 PREGNANCY, INCIDENTAL: ICD-10-CM

## 2021-02-26 DIAGNOSIS — Z86.39 HISTORY OF CUSHING DISEASE: ICD-10-CM

## 2021-02-26 NOTE — TELEPHONE ENCOUNTER
Care Coordinator from [facility] called to say that Dean is planning to transfer care to a OB in [place] and plans to deliver at [facility]. Her appt on Monday will be the last one with [facility]. Writer notified Dr. Gaines who is at [facility] on 3/1.   Andria Fitzgerald RN

## 2021-03-01 ENCOUNTER — OFFICE VISIT (OUTPATIENT)
Dept: MATERNAL FETAL MEDICINE | Facility: CLINIC | Age: 36
End: 2021-03-01
Attending: OBSTETRICS & GYNECOLOGY
Payer: COMMERCIAL

## 2021-03-01 ENCOUNTER — HOSPITAL ENCOUNTER (OUTPATIENT)
Dept: ULTRASOUND IMAGING | Facility: CLINIC | Age: 36
End: 2021-03-01
Attending: OBSTETRICS & GYNECOLOGY
Payer: COMMERCIAL

## 2021-03-01 DIAGNOSIS — Z33.1 PREGNANCY, INCIDENTAL: ICD-10-CM

## 2021-03-01 DIAGNOSIS — O30.032 MONOCHORIONIC DIAMNIOTIC TWIN GESTATION IN SECOND TRIMESTER: ICD-10-CM

## 2021-03-01 DIAGNOSIS — E03.8 OTHER SPECIFIED HYPOTHYROIDISM: ICD-10-CM

## 2021-03-01 DIAGNOSIS — O30.033 MONOCHORIONIC DIAMNIOTIC TWIN GESTATION IN THIRD TRIMESTER: Primary | ICD-10-CM

## 2021-03-01 DIAGNOSIS — Z86.39 HISTORY OF CUSHING DISEASE: ICD-10-CM

## 2021-03-01 LAB — CORTIS SERPL-MCNC: 21.3 UG/DL (ref 4–22)

## 2021-03-01 PROCEDURE — 76816 OB US FOLLOW-UP PER FETUS: CPT | Mod: 59

## 2021-03-01 PROCEDURE — 76821 MIDDLE CEREBRAL ARTERY ECHO: CPT | Mod: 26 | Performed by: OBSTETRICS & GYNECOLOGY

## 2021-03-01 PROCEDURE — 76821 MIDDLE CEREBRAL ARTERY ECHO: CPT | Performed by: OBSTETRICS & GYNECOLOGY

## 2021-03-01 PROCEDURE — 76820 UMBILICAL ARTERY ECHO: CPT | Mod: 26 | Performed by: OBSTETRICS & GYNECOLOGY

## 2021-03-01 PROCEDURE — 76816 OB US FOLLOW-UP PER FETUS: CPT | Mod: 26 | Performed by: OBSTETRICS & GYNECOLOGY

## 2021-03-01 PROCEDURE — 82533 TOTAL CORTISOL: CPT

## 2021-03-01 PROCEDURE — 36415 COLL VENOUS BLD VENIPUNCTURE: CPT

## 2021-03-02 ENCOUNTER — TELEPHONE (OUTPATIENT)
Dept: ENDOCRINOLOGY | Facility: CLINIC | Age: 36
End: 2021-03-02

## 2021-03-02 NOTE — TELEPHONE ENCOUNTER
3/2/2021 call to Dean Juan:  Twin Baby's will likely be delivered by c section.  She doesn't have delivery date yet. She is getting scans every 3 days at this point.     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  Anticipate stress dose steroid for delivery (100 mg iv every 8 hours for the first 24 hours) with rapid taper assuming she is doing well .  LT4 dose may remain as is for the rest of the pregnancy (LT4 200 x 7.5/week, divided) and may return to pre-pregnancy dose (150 mcg/day)  immediately post partum    To clinic navigator:  Please Fax this note to Dr Tyrell García at [phone].    To clinic RN- please connect with Dr García's nurse and make sure they got this message and /or can see into our care everywhere.  Thanks  Vivian George MD

## 2021-03-03 ENCOUNTER — TELEPHONE (OUTPATIENT)
Dept: ENDOCRINOLOGY | Facility: CLINIC | Age: 36
End: 2021-03-03

## 2021-03-03 NOTE — TELEPHONE ENCOUNTER
Called [phone]  Currently in with Pt.    Spoke w/ MICHAEL Lou for Dr García. Clarified order as written now:  hydrocortisone (CORTEF) 10 MG tablet 90 tablet 1 3/2/2021  --   Si mg AM and 10 mg afternoon       ASsking: Would like additional clarification: Taper as 50mg Q 8 hours/24 hours then 25mg Q8 hours/24? Or something else?     Peer to Peer: call number at ; if no answer please call   MICHAEL Lou .will be at her desk for 1.5 hours and can get Dr García right away.    Provider notified:  Erica Banks RN on 3/3/2021 at 11:11 AM     RE         Phone Message     May a detailed message be left on voicemail: yes      Reason for Call: Other: Dr. García Ob/gyn at [facility] is requesting a call back from Dr. George's nurse ASAP to discuss the Pt's steroid dosing for surgery. He is wanting to know if she is starting steroids now at 10 in am and 10 in afternoon, and also he wants to discuss how rapidly to taper them. Please call back at his clinic direct backline: [phone]. (Writer called endo clinic backline, but it went to .)      Action Taken: Message routed to:  [facility] endo     Travel Screening: Not Applicable          Verbal notes as written:  [facility]  Twin Baby's will likely be delivered by c section.  She doesn't have delivery date yet. She is getting scans every 3 days at this point.     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  Anticipate stress dose steroid for delivery (100 mg iv every 8 hours for the first 24 hours) with rapid taper assuming she is doing well .  LT4 dose may remain as is for the rest of the pregnancy (LT4 200 x 7.5/week, divided) and may return to pre-pregnancy dose (150 mcg/day)  immediately post partum     To clinic navigator:  Please Fax  this note to Dr Tyrell García at [phone].    To clinic RN- please connect with Dr García's nurse and make sure they got this message and /or can see into our care everywhere.  Thanks  Vivian George MD

## 2021-03-03 NOTE — TELEPHONE ENCOUNTER
Spoke w/ Jayce; transferred to nurse Acevedo for Dr García: created an encounter to document notes per Dr George:  Erica Banks, RN on 3/3/2021 at 8:57 AM     Verbal notes as written:  3/2/2021 call to Dean Juan:  Twin Baby's will likely be delivered by c section.  She doesn't have delivery date yet. She is getting scans every 3 days at this point.     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  Anticipate stress dose steroid for delivery (100 mg iv every 8 hours for the first 24 hours) with rapid taper assuming she is doing well .  LT4 dose may remain as is for the rest of the pregnancy (LT4 200 x 7.5/week, divided) and may return to pre-pregnancy dose (150 mcg/day)  immediately post partum     To clinic navigator:  Please Fax this note to Dr Tyrell García at [phone].    To clinic RN- please connect with Dr García's nurse and make sure they got this message and /or can see into our care everywhere.  Thanks  Vivian George MD    to    [phone]  Tyrell García MD [facility]

## 2021-03-03 NOTE — TELEPHONE ENCOUNTER
3/3/2021 1118  Returned call to Dr García. He had question about rate of taper. I have reviewed scenarios for decision making to be used at the time.  The rate will depend on how she is doing.   Vivian George MD

## 2021-03-03 NOTE — CONFIDENTIAL NOTE
Health Call Center    Phone Message    May a detailed message be left on voicemail: yes     Reason for Call: Other: Dr. García Ob/gyn at [facility] is requesting a call back from Dr. George's nurse ASAP to discuss the Pt's steroid dosing for surgery. He is wanting to know if she is starting steroids now at 10 in am and 10 in afternoon, and also he wants to discuss how rapidly to taper them. Please call back at his clinic direct backline: [phone]. (Writer called [facility] clinic backline, but it went to .)     Action Taken: Message routed to:  [facility] endo    Travel Screening: Not Applicable

## 2021-03-17 ENCOUNTER — MYC MEDICAL ADVICE (OUTPATIENT)
Dept: ENDOCRINOLOGY | Facility: CLINIC | Age: 36
End: 2021-03-17

## 2021-04-18 DIAGNOSIS — E03.8 OTHER SPECIFIED HYPOTHYROIDISM: Primary | ICD-10-CM

## 2021-04-18 DIAGNOSIS — Z86.39 HISTORY OF CUSHING DISEASE: ICD-10-CM

## 2021-04-18 DIAGNOSIS — Z33.1 PREGNANCY, INCIDENTAL: ICD-10-CM

## 2021-04-18 DIAGNOSIS — Z86.39 HISTORY OF CUSHING DISEASE: Primary | ICD-10-CM

## 2021-04-18 DIAGNOSIS — E03.8 OTHER SPECIFIED HYPOTHYROIDISM: ICD-10-CM

## 2021-04-18 RX ORDER — HYDROCORTISONE 10 MG/1
20 TABLET ORAL DAILY
Qty: 90 TABLET | Refills: 1 | COMMUNITY
Start: 2021-04-18 | End: 2021-05-20

## 2021-04-18 RX ORDER — LEVOTHYROXINE SODIUM 150 UG/1
150 TABLET ORAL DAILY
Qty: 90 TABLET | Refills: 4 | Status: SHIPPED | OUTPATIENT
Start: 2021-04-18 | End: 2022-06-26

## 2021-04-20 ENCOUNTER — TELEPHONE (OUTPATIENT)
Dept: ENDOCRINOLOGY | Facility: CLINIC | Age: 36
End: 2021-04-20

## 2021-04-20 NOTE — TELEPHONE ENCOUNTER
[facility] Call Center    Phone Message    May a detailed message be left on voicemail: yes     Reason for Call: Other: Per Patient states she is 5 days post delievery. Patient states she was admitted to the hospital with ACTIVE hemorrhage and alot of bleeding today, 4/20/2021. Patient states Dr. George had been advising patient on tranistioning pregnancy med to post partum. Patient thinks with everything that happened today, the medication might need to be adjusted more. Patient is wanting to get a call back. Please advise.        Patient prefers to speak with Dr. George. Patient states did have surgery today.     Action Taken: Message routed to:  [facility] ENDO    Travel Screening: Not Applicable

## 2021-04-21 NOTE — TELEPHONE ENCOUNTER
I had to leave a detailed message for Dean on the hydrocortisone IV recommendations . My direct number was given and she could also have staff look in the chart to this recommendation per Dr Ariel Mccullough, RN on 4/21/2021 at 10:19 AM

## 2021-04-21 NOTE — TELEPHONE ENCOUNTER
See below : Is she needing to adjust her medication  with  Post partum hemorrhage hospital admit ? Sharon Mccullough RN on 4/21/2021 at 10:00 AM

## 2021-04-21 NOTE — TELEPHONE ENCOUNTER
Chart review  4/17/2021 0921 cortisol 4.3.  This indicates she continues to require hydrocortisone.  [facility] record from 4/20/2021 shows she got hydrocortisone 100 mg     Please return call to [facility].      Tell her I can see on the record they gave her stress dose hydrocortisone for the surgery.    She should also have stress dose for the  early post operative period.    Stress dose hydrocortisone might be 100 mg iv every 8 hours.  This can taper quickly over a period of days back to the baseline hydrocortisone dose if she is doing well.    Vivian George MD

## 2021-04-28 ENCOUNTER — TELEPHONE (OUTPATIENT)
Dept: ENDOCRINOLOGY | Facility: CLINIC | Age: 36
End: 2021-04-28

## 2021-04-28 DIAGNOSIS — Z86.39 HISTORY OF CUSHING DISEASE: Primary | ICD-10-CM

## 2021-04-28 NOTE — TELEPHONE ENCOUNTER
----- Message from Shaorn Mccullough RN sent at 4/28/2021  4:26 PM CDT -----  Regarding: urgent  Lab slip for the TSH, Ft4 and cortisol needed to [facility] by 8 AM

## 2021-04-28 NOTE — TELEPHONE ENCOUNTER
"4/28/2021 call to patient 1823--- 1838  She delivered twins on 4/16/2021 4/18/2021 I sent her a note that she could reduce HC to 20 mg AM only.  Today she says she believes she was taking   On 4/202021 she had massive postpartum hemorrhage and had to have surgery.   She notes she saw an endocrinologist when at [facility].  She believes she had another cortisol after the surgery and it was higher.    She \"stopped the taper of HC\"  on Sunday.  She has taken no hydrocortisone for 3 days.  She says the endocrinologist at [place] told her to do this.   Past couple of days feeling lightheaded, very tired.  Today some nausea ;  Since then, she took a nap and now she doesn't have nausea.   She still has hydrocortisone 10 mg tablets.   Recommend: start hydrocortisone 20 mg now and 20 mg tomorrow AM and q AM.   Don't get labs tomorrow AM.  Get AM labs prior to HC in a few days.  15 minute phone conversation.   Vivian George MD  "

## 2021-05-20 ENCOUNTER — MYC MEDICAL ADVICE (OUTPATIENT)
Dept: ENDOCRINOLOGY | Facility: CLINIC | Age: 36
End: 2021-05-20

## 2021-05-20 DIAGNOSIS — Z33.1 PREGNANCY, INCIDENTAL: ICD-10-CM

## 2021-05-20 DIAGNOSIS — Z86.39 HISTORY OF CUSHING DISEASE: ICD-10-CM

## 2021-05-20 DIAGNOSIS — E03.8 OTHER SPECIFIED HYPOTHYROIDISM: ICD-10-CM

## 2021-05-21 DIAGNOSIS — Z86.39 HISTORY OF CUSHING DISEASE: ICD-10-CM

## 2021-05-21 DIAGNOSIS — Z33.1 PREGNANCY, INCIDENTAL: ICD-10-CM

## 2021-05-21 DIAGNOSIS — E03.8 OTHER SPECIFIED HYPOTHYROIDISM: ICD-10-CM

## 2021-05-21 RX ORDER — HYDROCORTISONE 10 MG/1
20 TABLET ORAL DAILY
Qty: 90 TABLET | Refills: 1 | Status: SHIPPED | OUTPATIENT
Start: 2021-05-21 | End: 2024-09-10

## 2021-05-21 RX ORDER — HYDROCORTISONE 10 MG/1
20 TABLET ORAL DAILY
Qty: 90 TABLET | Refills: 1 | Status: SHIPPED | OUTPATIENT
Start: 2021-05-21 | End: 2021-05-21

## 2021-05-21 NOTE — TELEPHONE ENCOUNTER
Provider notified.   Previous order historical.  Sent as escript.   Pt notified.   Erica Banks RN on 5/21/2021 at 12:00 PM

## 2021-05-29 ENCOUNTER — RECORDS - HEALTHEAST (OUTPATIENT)
Dept: ADMINISTRATIVE | Facility: CLINIC | Age: 36
End: 2021-05-29

## 2021-05-30 ENCOUNTER — RECORDS - HEALTHEAST (OUTPATIENT)
Dept: ADMINISTRATIVE | Facility: CLINIC | Age: 36
End: 2021-05-30

## 2021-06-04 DIAGNOSIS — E03.8 OTHER SPECIFIED HYPOTHYROIDISM: ICD-10-CM

## 2021-06-04 DIAGNOSIS — Z86.39 HISTORY OF CUSHING DISEASE: ICD-10-CM

## 2021-06-04 LAB
CORTIS SERPL-MCNC: 18.1 UG/DL (ref 4–22)
T4 FREE SERPL-MCNC: 1.33 NG/DL (ref 0.76–1.46)
TSH SERPL DL<=0.005 MIU/L-ACNC: <0.01 MU/L (ref 0.4–4)

## 2021-06-04 PROCEDURE — 82533 TOTAL CORTISOL: CPT

## 2021-06-04 PROCEDURE — 84439 ASSAY OF FREE THYROXINE: CPT

## 2021-06-04 PROCEDURE — 84443 ASSAY THYROID STIM HORMONE: CPT

## 2021-06-04 PROCEDURE — 36415 COLL VENOUS BLD VENIPUNCTURE: CPT

## 2021-09-05 ENCOUNTER — HEALTH MAINTENANCE LETTER (OUTPATIENT)
Age: 36
End: 2021-09-05

## 2022-02-20 ENCOUNTER — HEALTH MAINTENANCE LETTER (OUTPATIENT)
Age: 37
End: 2022-02-20

## 2022-06-21 DIAGNOSIS — E03.8 OTHER SPECIFIED HYPOTHYROIDISM: ICD-10-CM

## 2022-06-26 NOTE — TELEPHONE ENCOUNTER
levothyroxine (SYNTHROID/LEVOTHROID) 150 MCG tablet      Last Written Prescription Date:  4/18/21  Last Fill Quantity: 90,   # refills: 4  Last Office Visit : 5/4/20  Future Office visit:  None scheduled    Routing refill request to provider for review/approval because:  Lab Test 06/04/21  0745   TSH <0.01*

## 2022-06-27 DIAGNOSIS — E03.8 OTHER SPECIFIED HYPOTHYROIDISM: Primary | ICD-10-CM

## 2022-06-27 RX ORDER — LEVOTHYROXINE SODIUM 150 UG/1
150 TABLET ORAL DAILY
Qty: 90 TABLET | Refills: 4 | Status: SHIPPED | OUTPATIENT
Start: 2022-06-27 | End: 2023-09-08

## 2022-10-23 ENCOUNTER — HEALTH MAINTENANCE LETTER (OUTPATIENT)
Age: 37
End: 2022-10-23

## 2022-10-27 ENCOUNTER — LAB (OUTPATIENT)
Dept: LAB | Facility: CLINIC | Age: 37
End: 2022-10-27
Payer: COMMERCIAL

## 2022-10-27 DIAGNOSIS — E03.8 OTHER SPECIFIED HYPOTHYROIDISM: ICD-10-CM

## 2022-10-27 PROCEDURE — 36415 COLL VENOUS BLD VENIPUNCTURE: CPT

## 2022-10-27 PROCEDURE — 84439 ASSAY OF FREE THYROXINE: CPT

## 2022-10-28 LAB — T4 FREE SERPL-MCNC: 1.38 NG/DL (ref 0.76–1.46)

## 2023-04-02 ENCOUNTER — HEALTH MAINTENANCE LETTER (OUTPATIENT)
Age: 38
End: 2023-04-02

## 2023-09-08 DIAGNOSIS — E03.8 OTHER SPECIFIED HYPOTHYROIDISM: ICD-10-CM

## 2023-09-08 RX ORDER — LEVOTHYROXINE SODIUM 150 UG/1
150 TABLET ORAL DAILY
Qty: 90 TABLET | Refills: 4 | Status: CANCELLED | OUTPATIENT
Start: 2023-09-08

## 2023-09-08 RX ORDER — LEVOTHYROXINE SODIUM 150 UG/1
150 TABLET ORAL DAILY
Qty: 90 TABLET | Refills: 4 | Status: SHIPPED | OUTPATIENT
Start: 2023-09-08 | End: 2024-02-01 | Stop reason: DRUGHIGH

## 2024-01-18 DIAGNOSIS — E03.8 OTHER SPECIFIED HYPOTHYROIDISM: Primary | ICD-10-CM

## 2024-01-25 ENCOUNTER — LAB (OUTPATIENT)
Dept: LAB | Facility: CLINIC | Age: 39
End: 2024-01-25
Payer: COMMERCIAL

## 2024-01-25 DIAGNOSIS — E03.8 OTHER SPECIFIED HYPOTHYROIDISM: ICD-10-CM

## 2024-01-25 PROCEDURE — 36415 COLL VENOUS BLD VENIPUNCTURE: CPT

## 2024-01-25 PROCEDURE — 84439 ASSAY OF FREE THYROXINE: CPT

## 2024-01-26 LAB — T4 FREE SERPL-MCNC: 1.83 NG/DL (ref 0.9–1.7)

## 2024-02-01 DIAGNOSIS — E03.8 OTHER SPECIFIED HYPOTHYROIDISM: Primary | ICD-10-CM

## 2024-02-01 RX ORDER — LEVOTHYROXINE SODIUM 137 UG/1
137 TABLET ORAL DAILY
Qty: 90 TABLET | Refills: 4 | Status: SHIPPED | OUTPATIENT
Start: 2024-02-01

## 2024-03-24 ENCOUNTER — HEALTH MAINTENANCE LETTER (OUTPATIENT)
Age: 39
End: 2024-03-24

## 2024-05-07 ENCOUNTER — TELEPHONE (OUTPATIENT)
Dept: ENDOCRINOLOGY | Facility: CLINIC | Age: 39
End: 2024-05-07
Payer: COMMERCIAL

## 2024-05-07 NOTE — TELEPHONE ENCOUNTER
Patient confirmed scheduled appointment:  Date: 9/10  Time: 3 pm   Visit type: return endocrine   Provider: Ariel   [place] [facility] [place]  Testing/imaging: NA   Additional notes: Spoke to pt and re-yuliya appt on 9/4 to tete George due to change in the providers schedule     Delmy Herrera on 5/7/2024 at 11:00 AM

## 2024-09-10 ENCOUNTER — VIRTUAL VISIT (OUTPATIENT)
Dept: ENDOCRINOLOGY | Facility: CLINIC | Age: 39
End: 2024-09-10
Payer: COMMERCIAL

## 2024-09-10 DIAGNOSIS — R23.2 RED FACE: ICD-10-CM

## 2024-09-10 DIAGNOSIS — Z86.39 HISTORY OF CUSHING DISEASE: ICD-10-CM

## 2024-09-10 DIAGNOSIS — Z98.890 HISTORY OF PITUITARY SURGERY: ICD-10-CM

## 2024-09-10 DIAGNOSIS — E03.8 OTHER SPECIFIED HYPOTHYROIDISM: Primary | ICD-10-CM

## 2024-09-10 DIAGNOSIS — E23.0 HYPOGONADOTROPIC HYPOGONADISM (H): ICD-10-CM

## 2024-09-10 PROCEDURE — 99204 OFFICE O/P NEW MOD 45 MIN: CPT | Mod: 95

## 2024-09-10 RX ORDER — NORTRIPTYLINE HCL 10 MG
3 CAPSULE ORAL AT BEDTIME
COMMUNITY
Start: 2019-09-10

## 2024-09-10 RX ORDER — NORGESTIMATE AND ETHINYL ESTRADIOL 0.25-0.035
KIT ORAL
COMMUNITY
Start: 2023-09-10

## 2024-09-10 NOTE — NURSING NOTE
Current patient location: [address] [place] [id number]    Is the patient currently in the state of [place]? YES    Visit mode:VIDEO    If the visit is dropped, the patient can be reconnected by: VIDEO VISIT: Send to e-mail at: [email or web address]    Will anyone else be joining the visit? NO  (If patient encounters technical issues they should call [phone])    How would you like to obtain your AVS? MyChart    Are changes needed to the allergy or medication list? No    Are refills needed on medications prescribed by this physician? YES    Rooming Documentation:  Not applicable      Reason for visit: RECHECK    Nicci Ellison CMA [facility]

## 2024-09-10 NOTE — LETTER
9/10/2024       RE: Dean Weathers  [address] [address]     Dear Colleague,    Thank you for referring your patient, Dean Weathers, to the [facility] [place] at [facility]. Please see a copy of my visit note below.    08/13/24 10:42 AM  PATIENT LAB/IMAGING STATUS : No pending lab orders   Winter Mercado CMA      Endocrinology Attending video visit     Attending ASSESSMENT/PLAN:   1.  History of Cushings disease 2003. Her visits should allow the physician to see her face due to this history - either face to face of video visits.  Pituitary MRI  DXA   Red face raises concern of recurrence but she otherwise doesn't look  cushingoid  AM labs : cortisol , prolactin, free T4.  If normal would then do additional tests to exclude cushing .     Central hypothyroidism.   On LT4 137 mcg/day  Labs     Hypogonadotropic hypogonadism documented 2022 prior to start of  estrogen containing OCP.  By history today she didn't have regular periods prior to or after the cushing surgery.  She has used IVF to conceived for 2 pregnancies.   DXA     RED face is again noted today - She is a known flusher/ blusher. She doesn't otherwise look cushingoid     Due to the continued risks of COVID 19 transmission and to improve ease of access this visit was a video visit. The patient gave verbal consent for the visit today.    I have independently reviewed and interpreted labs, imaging as indicated.     Distant Location (provider location):  Off-site  Mode of Communication:  Video Conference via [employer]  Chart review/prep time 1  [id number]  Visit Start time 1453   Visit Stop time 1515   55__ minutes spent on the date of the encounter doing chart review, history and exam, documentation and further activities as noted above.    Vivian George.       HISTORY OF PRESENT ILLNESS Dean re-presents for follow up of history of cushing disease,  central hypothyroidism. I last saw her 5/4/2020.  There have been multiple mychart notes since then including through 2021 pregnancy.  My past notes indicated she should have face to face or video visits in the future.   She has been told to schedule follow up appt on several occasions.  At our last visit she was on LT4 150 mcg/day.  She is now on Lt4 137 mcg/day since 1/2024 .     Dean had Cushing disease in 2003, age 17, presenting with HA,secondary amenorrhea, weight gain, acne, low energy.  She was surgically treated 3/04 and 9/05 (the 2nd because of recurrent symptoms). Today she says she never had a period again after the pituitary operations.   She took HC about one year following the surgery.   Cortrosyn stimulated test on 6/4/15 had normal results (cortisol 12.4 - 19.4- 22.7).  Midnight salivary cortisol 6/4/15 was normal at 0.05 mcg/dl.      weights  7/20/16: 127  9/10/18 114  11/29/19 115 lbs  2/17/21 during pregnancy 142      See my 2018 note for labs between 11/19/03 and 4/13/15.  Twin pregnancy 2021- started HC 20/10 at 30 weeks gestation and LT4 dose increased to 215 mcg/day  1/25/21 cortisol 31.2  2/24/21 cortisol 16.5  3/1/21 cortisol 21.3  4/15/21 vaginal delivery of twins- during labor she got stress dose hydrocortisone 100 mg every 8hours.    4/17/21 cortisol 4.3  4/28/21 cortisol 10.8  6/2021 HC discontinued   6/4/21 0745 cortisol 18.1  free T4 1.33,   10/27/22 free t4 1.38  11/14/22 estradiol 12 , FSH 8.2 , OH 1.9   1/25/24 free t4 1.83 - On LT4 150 mcg/day . reduce  LT4 to 137 mcg/day    We have the following images  6/24/17 pituitary MRI:  No pituitaty mass    REVIEW OF SYSTEMS  Feels well  Weight gain ? 8 # since reducing LT4 to 137 mcg/day - current 110  Rarely has headaches   Breast fed the babies for 1 year - no periods during that time  Hasn't had normal period as long as I can remember   Ob started  on OCP to give me estrogen -- within a year of babies being born   After stopping OCP  2020 she never got a period  Twins were conceived by IVF   Had covid positive test on Friday - under the weather x 5 days.  Feel warm  Strength good - did a relay running race this summer -   Good strength with the children   Cardiac: negative  Respiratory: negative  GI: negative  No dizziness.    Still ? Has Raynauds but doesn't know since its summer.      Past Medical History  Past Medical History:   Diagnosis Date     Central hypothyroidism 2010    diagnosis at [place] 8/10     Cushing disease (H) 2003    [facility] of previous reproductive problem     IVF pregnancy     Raynaud's syndrome      Unspecified endocrine disorder     Pituitary Tumor,Microadenoma     Past Surgical History:   Procedure Laterality Date     PITUITARY SURGERY  3/04 and 9/05    for cushing disease     wisdom teeth       ZZC NONSPECIFIC PROCEDURE  3/04, 9/05    Mass on pituitary gland. 1/2 of gland removed     Medications    Current Outpatient Medications   Medication Sig Dispense Refill     acetaminophen (TYLENOL) 325 MG tablet Take 325-650 mg by mouth every 6 hours as needed for mild pain       aspirin 81 MG EC tablet Take 81 mg by mouth daily       doxylamine (UNISOM) 25 MG TABS tablet Take 12.5 mg by mouth At Bedtime       ferrous sulfate (FEROSUL) 325 (65 Fe) MG tablet Take 325 mg by mouth once a week       hydrocortisone (CORTEF) 10 MG tablet Take 2 tablets (20 mg) by mouth daily 90 tablet 1     levothyroxine (SYNTHROID/LEVOTHROID) 137 MCG tablet Take 1 tablet (137 mcg) by mouth daily 90 tablet 4     Prenatal Vit-Fe Fumarate-FA (PRENATAL MULTIVITAMIN  PLUS IRON) 27-1 MG TABS Take by mouth daily       vitamin D3 (CHOLECALCIFEROL) 10 MCG (400 UNIT) capsule Take 1 capsule by mouth daily       OCP is SCOTTY OCP which si combination ethinyl estradiol/norgestimate     Social History  Social History     Tobacco Use     Smoking status: Never     Smokeless tobacco: Never   Substance Use Topics     Alcohol use: No     Drug use:  "No     Twins are 3.5 ; 6 year girl also; ;     Physical Exam  BP Readings from Last 1 Encounters:   02/17/21 114/69      Pulse Readings from Last 1 Encounters:   02/17/21 90      Resp Readings from Last 1 Encounters:   02/17/21 16      Temp Readings from Last 1 Encounters:   02/17/21 98.5  F (36.9  C) (Temporal)      SpO2 Readings from Last 1 Encounters:   11/17/20 100%      Wt Readings from Last 1 Encounters:   02/17/21 64.4 kg (142 lb)      Ht Readings from Last 1 Encounters:   02/17/21 1.626 m (5' 4\")     GENERAL: healthy, alert and no distress' she does not look cushingoid . I can see from lower trunk up  SKIN: red face   EYES: Eyes grossly normal to inspection  RESP: no audible wheeze, cough, or  increased work of breathing.    NEURO: mentation intact and speech normal  PSYCH: mentation appears normal, affect normal/bright, normal speech and appearance well-groomed     Latest Reference Range & Units 06/04/21 07:45 10/27/22 13:45 01/25/24 14:29   Cortisol Serum 4 - 22 ug/dL 18.1     T4 Free 0.90 - 1.70 ng/dL 1.33 1.38 1.83 (H)   TSH 0.40 - 4.00 mU/L <0.01 (L)           Again, thank you for allowing me to participate in the care of your patient.      Sincerely,    Vivian George MD    "

## 2024-09-10 NOTE — PROGRESS NOTES
Endocrinology Attending video visit     Attending ASSESSMENT/PLAN:   1.  History of Cushings disease 2003. Her visits should allow the physician to see her face due to this history - either face to face of video visits.  Pituitary MRI  DXA   Red face raises concern of recurrence but she otherwise doesn't look  cushingoid  AM labs : cortisol , prolactin, free T4.  If normal would then do additional tests to exclude cushing .     Central hypothyroidism.   On LT4 137 mcg/day  Labs     Hypogonadotropic hypogonadism documented 2022 prior to start of  estrogen containing OCP.  By history today she didn't have regular periods prior to or after the cushing surgery.  She has used IVF to conceived for 2 pregnancies.   DXA     RED face is again noted today - She is a known flusher/ blusher. She doesn't otherwise look cushingoid     Due to the continued risks of COVID 19 transmission and to improve ease of access this visit was a video visit. The patient gave verbal consent for the visit today.    I have independently reviewed and interpreted labs, imaging as indicated.     Distant Location (provider location):  Off-site  Mode of Communication:  Video Conference via [employer]  Chart review/prep time 1  [id number]  Visit Start time 1453   Visit Stop time 1515   55__ minutes spent on the date of the encounter doing chart review, history and exam, documentation and further activities as noted above.    Vivian George.       HISTORY OF PRESENT ILLNESS Dean re-presents for follow up of history of cushing disease, central hypothyroidism. I last saw her 5/4/2020.  There have been multiple mychart notes since then including through 2021 pregnancy.  My past notes indicated she should have face to face or video visits in the future.   She has been told to schedule follow up appt on several occasions.  At our last visit she was on LT4 150 mcg/day.  She is now on Lt4 137 mcg/day since 1/2024 .     Dean had Cushing disease in 2003,  age 17, presenting with HA,secondary amenorrhea, weight gain, acne, low energy.  She was surgically treated 3/04 and 9/05 (the 2nd because of recurrent symptoms). Today she says she never had a period again after the pituitary operations.   She took HC about one year following the surgery.   Cortrosyn stimulated test on 6/4/15 had normal results (cortisol 12.4 - 19.4- 22.7).  Midnight salivary cortisol 6/4/15 was normal at 0.05 mcg/dl.      weights  7/20/16: 127  9/10/18 114  11/29/19 115 lbs  2/17/21 during pregnancy 142      See my 2018 note for labs between 11/19/03 and 4/13/15.  Twin pregnancy 2021- started HC 20/10 at 30 weeks gestation and LT4 dose increased to 215 mcg/day  1/25/21 cortisol 31.2  2/24/21 cortisol 16.5  3/1/21 cortisol 21.3  4/15/21 vaginal delivery of twins- during labor she got stress dose hydrocortisone 100 mg every 8hours.    4/17/21 cortisol 4.3  4/28/21 cortisol 10.8  6/2021 HC discontinued   6/4/21 0745 cortisol 18.1  free T4 1.33,   10/27/22 free t4 1.38  11/14/22 estradiol 12 , FSH 8.2 , OH 1.9   1/25/24 free t4 1.83 - On LT4 150 mcg/day . reduce  LT4 to 137 mcg/day    We have the following images  6/24/17 pituitary MRI:  No pituitaty mass    REVIEW OF SYSTEMS  Feels well  Weight gain ? 8 # since reducing LT4 to 137 mcg/day - current 110  Rarely has headaches   Breast fed the babies for 1 year - no periods during that time  Hasn't had normal period as long as I can remember   Ob started  on OCP to give me estrogen -- within a year of babies being born   After stopping OCP 2020 she never got a period  Twins were conceived by IVF   Had covid positive test on Friday - under the weather x 5 days.  Feel warm  Strength good - did a [employer] running race this summer -   Good strength with the children   Cardiac: negative  Respiratory: negative  GI: negative  No dizziness.    Still ? Has Raynauds but doesn't know since its summer.      Past Medical History  Past Medical History:   Diagnosis Date     Central hypothyroidism 2010    diagnosis at [place] 8/10    Cushing disease (H) 2003    [facility] of previous reproductive problem     IVF pregnancy    Raynaud's syndrome     Unspecified endocrine disorder     Pituitary Tumor,Microadenoma     Past Surgical History:   Procedure Laterality Date    PITUITARY SURGERY  3/04 and 9/05    for cushing disease    wisdom teeth      ZZC NONSPECIFIC PROCEDURE  3/04, 9/05    Mass on pituitary gland. 1/2 of gland removed     Medications    Current Outpatient Medications   Medication Sig Dispense Refill    acetaminophen (TYLENOL) 325 MG tablet Take 325-650 mg by mouth every 6 hours as needed for mild pain      aspirin 81 MG EC tablet Take 81 mg by mouth daily      doxylamine (UNISOM) 25 MG TABS tablet Take 12.5 mg by mouth At Bedtime      ferrous sulfate (FEROSUL) 325 (65 Fe) MG tablet Take 325 mg by mouth once a week      hydrocortisone (CORTEF) 10 MG tablet Take 2 tablets (20 mg) by mouth daily 90 tablet 1    levothyroxine (SYNTHROID/LEVOTHROID) 137 MCG tablet Take 1 tablet (137 mcg) by mouth daily 90 tablet 4    Prenatal Vit-Fe Fumarate-FA (PRENATAL MULTIVITAMIN  PLUS IRON) 27-1 MG TABS Take by mouth daily      vitamin D3 (CHOLECALCIFEROL) 10 MCG (400 UNIT) capsule Take 1 capsule by mouth daily       OCP is SCOTTY OCP which si combination ethinyl estradiol/norgestimate     Social History  Social History     Tobacco Use    Smoking status: Never    Smokeless tobacco: Never   Substance Use Topics    Alcohol use: No    Drug use: No     Twins are 3.5 ; 6 year girl also; ;     Physical Exam  BP Readings from Last 1 Encounters:   02/17/21 114/69      Pulse Readings from Last 1 Encounters:   02/17/21 90      Resp Readings from Last 1 Encounters:   02/17/21 16      Temp Readings from Last 1 Encounters:   02/17/21 98.5  F (36.9  C) (Temporal)      SpO2 Readings from Last 1 Encounters:   11/17/20 100%      Wt Readings from Last 1 Encounters:   02/17/21 64.4 kg  "(142 lb)      Ht Readings from Last 1 Encounters:   02/17/21 1.626 m (5' 4\")     GENERAL: healthy, alert and no distress' she does not look cushingoid . I can see from lower trunk up  SKIN: red face   EYES: Eyes grossly normal to inspection  RESP: no audible wheeze, cough, or  increased work of breathing.    NEURO: mentation intact and speech normal  PSYCH: mentation appears normal, affect normal/bright, normal speech and appearance well-groomed     Latest Reference Range & Units 06/04/21 07:45 10/27/22 13:45 01/25/24 14:29   Cortisol Serum 4 - 22 ug/dL 18.1     T4 Free 0.90 - 1.70 ng/dL 1.33 1.38 1.83 (H)   TSH 0.40 - 4.00 mU/L <0.01 (L)       "

## 2024-09-19 ENCOUNTER — TELEPHONE (OUTPATIENT)
Dept: ENDOCRINOLOGY | Facility: CLINIC | Age: 39
End: 2024-09-19
Payer: COMMERCIAL

## 2024-09-19 NOTE — TELEPHONE ENCOUNTER
Left Voicemail (1st Attempt) for the patient to call back and schedule the following:    Appointment type: MRI & DEXA   Provider: Ariel   Return date: next avail   Specialty phone number: [phone]  Additional appointment(s) needed: Labs b4 8 am, next avail   Additonal Notes: LVM, MyC x1   Check Out Comments from visit on 9/10:   1. Labs before 8 AM   2. Pituitary MRI in the [facility] system  3. DXA in the [facility] system   4. RTC to be determined timeline (scheduled)     Delmy Herrera on 9/19/2024 at 8:50 AM

## 2024-10-11 ENCOUNTER — LAB (OUTPATIENT)
Dept: LAB | Facility: CLINIC | Age: 39
End: 2024-10-11
Payer: COMMERCIAL

## 2024-10-11 DIAGNOSIS — Z86.39 HISTORY OF CUSHING DISEASE: ICD-10-CM

## 2024-10-11 DIAGNOSIS — Z98.890 HISTORY OF PITUITARY SURGERY: ICD-10-CM

## 2024-10-11 DIAGNOSIS — E23.0 HYPOGONADOTROPIC HYPOGONADISM (H): ICD-10-CM

## 2024-10-11 DIAGNOSIS — R23.2 RED FACE: ICD-10-CM

## 2024-10-11 DIAGNOSIS — E03.8 OTHER SPECIFIED HYPOTHYROIDISM: ICD-10-CM

## 2024-10-11 LAB
CORTIS SERPL-MCNC: 42.8 UG/DL
PROLACTIN SERPL 3RD IS-MCNC: 7 NG/ML (ref 5–23)
T4 FREE SERPL-MCNC: 1.48 NG/DL (ref 0.9–1.7)

## 2024-10-11 PROCEDURE — 36415 COLL VENOUS BLD VENIPUNCTURE: CPT

## 2024-10-11 PROCEDURE — 84146 ASSAY OF PROLACTIN: CPT

## 2024-10-11 PROCEDURE — 82533 TOTAL CORTISOL: CPT

## 2024-10-11 PROCEDURE — 82627 DEHYDROEPIANDROSTERONE: CPT

## 2024-10-11 PROCEDURE — 84439 ASSAY OF FREE THYROXINE: CPT

## 2024-10-13 DIAGNOSIS — Z98.890 HISTORY OF PITUITARY SURGERY: ICD-10-CM

## 2024-10-13 DIAGNOSIS — Z86.39 HISTORY OF CUSHING DISEASE: Primary | ICD-10-CM

## 2024-10-13 RX ORDER — DEXAMETHASONE 1 MG
TABLET ORAL
Qty: 1 TABLET | Refills: 0 | Status: SHIPPED | OUTPATIENT
Start: 2024-10-13

## 2024-10-14 LAB — DHEA-S SERPL-MCNC: 32 UG/DL (ref 35–430)

## 2024-11-19 ENCOUNTER — HOSPITAL ENCOUNTER (OUTPATIENT)
Dept: BONE DENSITY | Facility: HOSPITAL | Age: 39
Discharge: HOME | End: 2024-11-19
Payer: COMMERCIAL

## 2024-11-19 DIAGNOSIS — R23.2 RED FACE: ICD-10-CM

## 2024-11-19 DIAGNOSIS — E03.8 OTHER SPECIFIED HYPOTHYROIDISM: ICD-10-CM

## 2024-11-19 DIAGNOSIS — Z98.890 HISTORY OF PITUITARY SURGERY: ICD-10-CM

## 2024-11-19 DIAGNOSIS — E23.0 HYPOGONADOTROPIC HYPOGONADISM (H): ICD-10-CM

## 2024-11-19 DIAGNOSIS — Z86.39 HISTORY OF CUSHING DISEASE: ICD-10-CM

## 2024-11-19 PROCEDURE — 77091 TBS TECHL CALCULATION ONLY: CPT

## 2024-11-19 PROCEDURE — 77080 DXA BONE DENSITY AXIAL: CPT

## 2024-11-25 ENCOUNTER — HOSPITAL ENCOUNTER (OUTPATIENT)
Dept: MRI IMAGING | Facility: HOSPITAL | Age: 39
Discharge: HOME | End: 2024-11-25
Payer: COMMERCIAL

## 2024-11-25 DIAGNOSIS — Z98.890 HISTORY OF PITUITARY SURGERY: ICD-10-CM

## 2024-11-25 DIAGNOSIS — E23.0 HYPOGONADOTROPIC HYPOGONADISM (H): ICD-10-CM

## 2024-11-25 DIAGNOSIS — Z86.39 HISTORY OF CUSHING DISEASE: ICD-10-CM

## 2024-11-25 DIAGNOSIS — E03.8 OTHER SPECIFIED HYPOTHYROIDISM: ICD-10-CM

## 2024-11-25 PROCEDURE — 255N000002 HC RX 255 OP 636

## 2024-11-25 PROCEDURE — 70553 MRI BRAIN STEM W/O & W/DYE: CPT

## 2024-11-25 PROCEDURE — A9585 GADOBUTROL INJECTION: HCPCS

## 2024-11-25 RX ORDER — GADOBUTROL 604.72 MG/ML
0.1 INJECTION INTRAVENOUS ONCE
Status: COMPLETED | OUTPATIENT
Start: 2024-11-25 | End: 2024-11-25

## 2024-11-25 RX ADMIN — GADOBUTROL 5 ML: 604.72 INJECTION INTRAVENOUS at 13:32

## 2024-12-03 ENCOUNTER — LAB (OUTPATIENT)
Dept: LAB | Facility: CLINIC | Age: 39
End: 2024-12-03
Payer: COMMERCIAL

## 2024-12-03 DIAGNOSIS — Z98.890 HISTORY OF PITUITARY SURGERY: ICD-10-CM

## 2024-12-03 DIAGNOSIS — Z86.39 HISTORY OF CUSHING DISEASE: ICD-10-CM

## 2024-12-03 LAB — CORTIS 8H P 1 MG DEX SERPL-MCNC: 5.7 UG/DL

## 2024-12-03 PROCEDURE — 82533 TOTAL CORTISOL: CPT

## 2024-12-03 PROCEDURE — 36415 COLL VENOUS BLD VENIPUNCTURE: CPT

## 2024-12-04 DIAGNOSIS — Z86.39 HISTORY OF CUSHING DISEASE: Primary | ICD-10-CM

## 2024-12-04 DIAGNOSIS — E03.8 OTHER SPECIFIED HYPOTHYROIDISM: ICD-10-CM

## 2024-12-04 DIAGNOSIS — R79.89 ABNORMAL CORTISOL LEVEL: ICD-10-CM

## 2025-02-10 ENCOUNTER — MYC REFILL (OUTPATIENT)
Dept: ENDOCRINOLOGY | Facility: CLINIC | Age: 40
End: 2025-02-10
Payer: COMMERCIAL

## 2025-02-10 DIAGNOSIS — E03.8 OTHER SPECIFIED HYPOTHYROIDISM: ICD-10-CM

## 2025-02-10 RX ORDER — LEVOTHYROXINE SODIUM 137 UG/1
137 TABLET ORAL DAILY
Qty: 90 TABLET | Refills: 4 | Status: SHIPPED | OUTPATIENT
Start: 2025-02-10

## 2025-04-13 ENCOUNTER — HEALTH MAINTENANCE LETTER (OUTPATIENT)
Age: 40
End: 2025-04-13

## 2025-05-05 ENCOUNTER — LAB (OUTPATIENT)
Dept: LAB | Facility: CLINIC | Age: 40
End: 2025-05-05
Payer: COMMERCIAL

## 2025-05-05 DIAGNOSIS — Z86.39 HISTORY OF CUSHING DISEASE: ICD-10-CM

## 2025-05-05 DIAGNOSIS — R79.89 ABNORMAL CORTISOL LEVEL: ICD-10-CM

## 2025-05-05 DIAGNOSIS — E03.8 OTHER SPECIFIED HYPOTHYROIDISM: ICD-10-CM
